# Patient Record
Sex: FEMALE | Race: WHITE | NOT HISPANIC OR LATINO
[De-identification: names, ages, dates, MRNs, and addresses within clinical notes are randomized per-mention and may not be internally consistent; named-entity substitution may affect disease eponyms.]

---

## 2020-12-13 ENCOUNTER — FORM ENCOUNTER (OUTPATIENT)
Age: 35
End: 2020-12-13

## 2020-12-14 ENCOUNTER — APPOINTMENT (OUTPATIENT)
Dept: OBGYN | Facility: CLINIC | Age: 35
End: 2020-12-14
Payer: COMMERCIAL

## 2020-12-14 PROCEDURE — 36415 COLL VENOUS BLD VENIPUNCTURE: CPT

## 2020-12-14 PROCEDURE — 99072 ADDL SUPL MATRL&STAF TM PHE: CPT

## 2020-12-14 PROCEDURE — 0500F INITIAL PRENATAL CARE VISIT: CPT

## 2020-12-14 PROCEDURE — 76813 OB US NUCHAL MEAS 1 GEST: CPT

## 2020-12-14 PROCEDURE — 76801 OB US < 14 WKS SINGLE FETUS: CPT | Mod: 59

## 2020-12-20 ENCOUNTER — FORM ENCOUNTER (OUTPATIENT)
Age: 35
End: 2020-12-20

## 2020-12-22 ENCOUNTER — FORM ENCOUNTER (OUTPATIENT)
Age: 35
End: 2020-12-22

## 2020-12-29 ENCOUNTER — FORM ENCOUNTER (OUTPATIENT)
Age: 35
End: 2020-12-29

## 2020-12-30 ENCOUNTER — NON-APPOINTMENT (OUTPATIENT)
Age: 35
End: 2020-12-30

## 2021-01-05 ENCOUNTER — APPOINTMENT (OUTPATIENT)
Dept: CARDIOLOGY | Facility: CLINIC | Age: 36
End: 2021-01-05
Payer: COMMERCIAL

## 2021-01-05 ENCOUNTER — OUTPATIENT (OUTPATIENT)
Dept: OUTPATIENT SERVICES | Facility: HOSPITAL | Age: 36
LOS: 1 days | End: 2021-01-05
Payer: COMMERCIAL

## 2021-01-05 ENCOUNTER — APPOINTMENT (OUTPATIENT)
Dept: CV DIAGNOSITCS | Facility: HOSPITAL | Age: 36
End: 2021-01-05

## 2021-01-05 ENCOUNTER — NON-APPOINTMENT (OUTPATIENT)
Age: 36
End: 2021-01-05

## 2021-01-05 VITALS
WEIGHT: 179 LBS | DIASTOLIC BLOOD PRESSURE: 76 MMHG | HEIGHT: 63 IN | SYSTOLIC BLOOD PRESSURE: 119 MMHG | HEART RATE: 77 BPM | OXYGEN SATURATION: 99 % | BODY MASS INDEX: 31.71 KG/M2

## 2021-01-05 DIAGNOSIS — Z34.90 ENCOUNTER FOR SUPERVISION OF NORMAL PREGNANCY, UNSPECIFIED, UNSPECIFIED TRIMESTER: ICD-10-CM

## 2021-01-05 PROCEDURE — 93306 TTE W/DOPPLER COMPLETE: CPT | Mod: 26

## 2021-01-05 PROCEDURE — 93000 ELECTROCARDIOGRAM COMPLETE: CPT

## 2021-01-05 PROCEDURE — 99244 OFF/OP CNSLTJ NEW/EST MOD 40: CPT

## 2021-01-05 PROCEDURE — 99072 ADDL SUPL MATRL&STAF TM PHE: CPT

## 2021-01-05 PROCEDURE — 93306 TTE W/DOPPLER COMPLETE: CPT

## 2021-01-05 RX ORDER — B-COMPLEX WITH VITAMIN C
TABLET ORAL
Refills: 0 | Status: ACTIVE | COMMUNITY

## 2021-01-05 NOTE — PHYSICAL EXAM
[General Appearance - Well Developed] : well developed [Normal Appearance] : normal appearance [Well Groomed] : well groomed [General Appearance - Well Nourished] : well nourished [No Deformities] : no deformities [General Appearance - In No Acute Distress] : no acute distress [Normal Conjunctiva] : the conjunctiva exhibited no abnormalities [Eyelids - No Xanthelasma] : the eyelids demonstrated no xanthelasmas [Normal Oral Mucosa] : normal oral mucosa [No Oral Pallor] : no oral pallor [No Oral Cyanosis] : no oral cyanosis [Normal Jugular Venous A Waves Present] : normal jugular venous A waves present [Normal Jugular Venous V Waves Present] : normal jugular venous V waves present [No Jugular Venous Nelson A Waves] : no jugular venous nelson A waves [Heart Rate And Rhythm] : heart rate and rhythm were normal [Heart Sounds] : normal S1 and S2 [Murmurs] : no murmurs present [Respiration, Rhythm And Depth] : normal respiratory rhythm and effort [Exaggerated Use Of Accessory Muscles For Inspiration] : no accessory muscle use [Auscultation Breath Sounds / Voice Sounds] : lungs were clear to auscultation bilaterally [Abdomen Soft] : soft [Abdomen Tenderness] : non-tender [Abdomen Mass (___ Cm)] : no abdominal mass palpated [Abnormal Walk] : normal gait [Gait - Sufficient For Exercise Testing] : the gait was sufficient for exercise testing [Nail Clubbing] : no clubbing of the fingernails [Cyanosis, Localized] : no localized cyanosis [Petechial Hemorrhages (___cm)] : no petechial hemorrhages [Skin Color & Pigmentation] : normal skin color and pigmentation [] : no rash [No Venous Stasis] : no venous stasis [Skin Lesions] : no skin lesions [No Skin Ulcers] : no skin ulcer [No Xanthoma] : no  xanthoma was observed [Oriented To Time, Place, And Person] : oriented to person, place, and time [Affect] : the affect was normal [Mood] : the mood was normal [No Anxiety] : not feeling anxious [FreeTextEntry1] : gravid

## 2021-01-05 NOTE — HISTORY OF PRESENT ILLNESS
[FreeTextEntry1] : Ele is a 35-year-old female 14 weeks gestation second pregnancy with lightheadedness. She has chronic pain and migraines on left side for which she sees a neurologist. Recently feeling lightheaded, clammy and flush which can occur at any time. She drinks a lot of fluid. Episodes happen mostly in the morning after breakfast. Did not happen last pregnancy (3.5 years ago)

## 2021-01-05 NOTE — DISCUSSION/SUMMARY
[FreeTextEntry1] : The patient is a 35-year-old female 14 weeks gestation, chronic migraines with symptomatic orthostasis. \par ECG normal, Drinks 100cc water daily, normal ECHO, add salt and compression stockings. Also treat GERD with TUMS which may help as well. If no improvement then low dose metoprolol

## 2021-01-06 ENCOUNTER — TRANSCRIPTION ENCOUNTER (OUTPATIENT)
Age: 36
End: 2021-01-06

## 2021-01-11 ENCOUNTER — APPOINTMENT (OUTPATIENT)
Dept: OBGYN | Facility: CLINIC | Age: 36
End: 2021-01-11
Payer: COMMERCIAL

## 2021-01-11 ENCOUNTER — APPOINTMENT (OUTPATIENT)
Dept: OBGYN | Facility: CLINIC | Age: 36
End: 2021-01-11

## 2021-01-11 PROCEDURE — 76817 TRANSVAGINAL US OBSTETRIC: CPT | Mod: 59

## 2021-01-11 PROCEDURE — 76816 OB US FOLLOW-UP PER FETUS: CPT

## 2021-01-11 PROCEDURE — 99072 ADDL SUPL MATRL&STAF TM PHE: CPT

## 2021-01-19 ENCOUNTER — FORM ENCOUNTER (OUTPATIENT)
Age: 36
End: 2021-01-19

## 2021-02-12 ENCOUNTER — ASOB RESULT (OUTPATIENT)
Age: 36
End: 2021-02-12

## 2021-02-12 ENCOUNTER — APPOINTMENT (OUTPATIENT)
Dept: ANTEPARTUM | Facility: CLINIC | Age: 36
End: 2021-02-12
Payer: COMMERCIAL

## 2021-02-12 PROCEDURE — 99072 ADDL SUPL MATRL&STAF TM PHE: CPT

## 2021-02-12 PROCEDURE — 76811 OB US DETAILED SNGL FETUS: CPT

## 2021-02-17 ENCOUNTER — APPOINTMENT (OUTPATIENT)
Dept: OBGYN | Facility: CLINIC | Age: 36
End: 2021-02-17

## 2021-03-10 ENCOUNTER — APPOINTMENT (OUTPATIENT)
Dept: OBGYN | Facility: CLINIC | Age: 36
End: 2021-03-10

## 2021-03-17 ENCOUNTER — OUTPATIENT (OUTPATIENT)
Dept: OUTPATIENT SERVICES | Facility: HOSPITAL | Age: 36
LOS: 1 days | End: 2021-03-17
Payer: COMMERCIAL

## 2021-03-17 ENCOUNTER — APPOINTMENT (OUTPATIENT)
Dept: OBGYN | Facility: CLINIC | Age: 36
End: 2021-03-17
Payer: COMMERCIAL

## 2021-03-17 VITALS — DIASTOLIC BLOOD PRESSURE: 61 MMHG | SYSTOLIC BLOOD PRESSURE: 121 MMHG | HEART RATE: 88 BPM

## 2021-03-17 VITALS
RESPIRATION RATE: 18 BRPM | HEART RATE: 81 BPM | SYSTOLIC BLOOD PRESSURE: 121 MMHG | OXYGEN SATURATION: 98 % | TEMPERATURE: 98 F | DIASTOLIC BLOOD PRESSURE: 61 MMHG

## 2021-03-17 VITALS — SYSTOLIC BLOOD PRESSURE: 128 MMHG | HEART RATE: 99 BPM | DIASTOLIC BLOOD PRESSURE: 67 MMHG

## 2021-03-17 DIAGNOSIS — Z3A.00 WEEKS OF GESTATION OF PREGNANCY NOT SPECIFIED: ICD-10-CM

## 2021-03-17 DIAGNOSIS — Z98.890 OTHER SPECIFIED POSTPROCEDURAL STATES: Chronic | ICD-10-CM

## 2021-03-17 DIAGNOSIS — O26.899 OTHER SPECIFIED PREGNANCY RELATED CONDITIONS, UNSPECIFIED TRIMESTER: ICD-10-CM

## 2021-03-17 DIAGNOSIS — Z98.891 HISTORY OF UTERINE SCAR FROM PREVIOUS SURGERY: Chronic | ICD-10-CM

## 2021-03-17 LAB
ALBUMIN SERPL ELPH-MCNC: 3.8 G/DL — SIGNIFICANT CHANGE UP (ref 3.3–5)
ALP SERPL-CCNC: 38 U/L — LOW (ref 40–120)
ALT FLD-CCNC: 12 U/L — SIGNIFICANT CHANGE UP (ref 10–45)
AMYLASE P1 CFR SERPL: 55 U/L — SIGNIFICANT CHANGE UP (ref 25–125)
ANION GAP SERPL CALC-SCNC: 15 MMOL/L — SIGNIFICANT CHANGE UP (ref 5–17)
APPEARANCE UR: CLEAR — SIGNIFICANT CHANGE UP
APTT BLD: 28.9 SEC — SIGNIFICANT CHANGE UP (ref 27.5–35.5)
AST SERPL-CCNC: 15 U/L — SIGNIFICANT CHANGE UP (ref 10–40)
BASOPHILS # BLD AUTO: 0.02 K/UL — SIGNIFICANT CHANGE UP (ref 0–0.2)
BASOPHILS NFR BLD AUTO: 0.2 % — SIGNIFICANT CHANGE UP (ref 0–2)
BILIRUB SERPL-MCNC: 0.1 MG/DL — LOW (ref 0.2–1.2)
BILIRUB UR-MCNC: NEGATIVE — SIGNIFICANT CHANGE UP
BUN SERPL-MCNC: 7 MG/DL — SIGNIFICANT CHANGE UP (ref 7–23)
CALCIUM SERPL-MCNC: 9.8 MG/DL — SIGNIFICANT CHANGE UP (ref 8.4–10.5)
CHLORIDE SERPL-SCNC: 103 MMOL/L — SIGNIFICANT CHANGE UP (ref 96–108)
CO2 SERPL-SCNC: 20 MMOL/L — LOW (ref 22–31)
COLOR SPEC: YELLOW — SIGNIFICANT CHANGE UP
CREAT ?TM UR-MCNC: 14 MG/DL — SIGNIFICANT CHANGE UP
CREAT SERPL-MCNC: 0.34 MG/DL — LOW (ref 0.5–1.3)
DIFF PNL FLD: NEGATIVE — SIGNIFICANT CHANGE UP
EOSINOPHIL # BLD AUTO: 0.05 K/UL — SIGNIFICANT CHANGE UP (ref 0–0.5)
EOSINOPHIL NFR BLD AUTO: 0.5 % — SIGNIFICANT CHANGE UP (ref 0–6)
FIBRINOGEN PPP-MCNC: 618 MG/DL — HIGH (ref 290–520)
GLUCOSE SERPL-MCNC: 82 MG/DL — SIGNIFICANT CHANGE UP (ref 70–99)
GLUCOSE UR QL: NEGATIVE — SIGNIFICANT CHANGE UP
HCT VFR BLD CALC: 35.6 % — SIGNIFICANT CHANGE UP (ref 34.5–45)
HGB BLD-MCNC: 11.6 G/DL — SIGNIFICANT CHANGE UP (ref 11.5–15.5)
IMM GRANULOCYTES NFR BLD AUTO: 1.4 % — SIGNIFICANT CHANGE UP (ref 0–1.5)
INR BLD: 1 RATIO — SIGNIFICANT CHANGE UP (ref 0.88–1.16)
KETONES UR-MCNC: NEGATIVE — SIGNIFICANT CHANGE UP
LDH SERPL L TO P-CCNC: 140 U/L — SIGNIFICANT CHANGE UP (ref 50–242)
LEUKOCYTE ESTERASE UR-ACNC: NEGATIVE — SIGNIFICANT CHANGE UP
LIDOCAIN IGE QN: 26 U/L — SIGNIFICANT CHANGE UP (ref 7–60)
LYMPHOCYTES # BLD AUTO: 1.73 K/UL — SIGNIFICANT CHANGE UP (ref 1–3.3)
LYMPHOCYTES # BLD AUTO: 16.7 % — SIGNIFICANT CHANGE UP (ref 13–44)
MCHC RBC-ENTMCNC: 29.7 PG — SIGNIFICANT CHANGE UP (ref 27–34)
MCHC RBC-ENTMCNC: 32.6 GM/DL — SIGNIFICANT CHANGE UP (ref 32–36)
MCV RBC AUTO: 91.3 FL — SIGNIFICANT CHANGE UP (ref 80–100)
MONOCYTES # BLD AUTO: 0.52 K/UL — SIGNIFICANT CHANGE UP (ref 0–0.9)
MONOCYTES NFR BLD AUTO: 5 % — SIGNIFICANT CHANGE UP (ref 2–14)
NEUTROPHILS # BLD AUTO: 7.9 K/UL — HIGH (ref 1.8–7.4)
NEUTROPHILS NFR BLD AUTO: 76.2 % — SIGNIFICANT CHANGE UP (ref 43–77)
NITRITE UR-MCNC: NEGATIVE — SIGNIFICANT CHANGE UP
NRBC # BLD: 0 /100 WBCS — SIGNIFICANT CHANGE UP (ref 0–0)
PH UR: 6.5 — SIGNIFICANT CHANGE UP (ref 5–8)
PLATELET # BLD AUTO: 229 K/UL — SIGNIFICANT CHANGE UP (ref 150–400)
POTASSIUM SERPL-MCNC: 4 MMOL/L — SIGNIFICANT CHANGE UP (ref 3.5–5.3)
POTASSIUM SERPL-SCNC: 4 MMOL/L — SIGNIFICANT CHANGE UP (ref 3.5–5.3)
PROT ?TM UR-MCNC: <4 MG/DL — SIGNIFICANT CHANGE UP (ref 0–12)
PROT SERPL-MCNC: 6.8 G/DL — SIGNIFICANT CHANGE UP (ref 6–8.3)
PROT UR-MCNC: NEGATIVE — SIGNIFICANT CHANGE UP
PROT/CREAT UR-RTO: SIGNIFICANT CHANGE UP (ref 0–0.2)
PROTHROM AB SERPL-ACNC: 12 SEC — SIGNIFICANT CHANGE UP (ref 10.6–13.6)
RBC # BLD: 3.9 M/UL — SIGNIFICANT CHANGE UP (ref 3.8–5.2)
RBC # FLD: 14.5 % — SIGNIFICANT CHANGE UP (ref 10.3–14.5)
SODIUM SERPL-SCNC: 138 MMOL/L — SIGNIFICANT CHANGE UP (ref 135–145)
SP GR SPEC: 1 — LOW (ref 1.01–1.02)
URATE SERPL-MCNC: 3.2 MG/DL — SIGNIFICANT CHANGE UP (ref 2.5–7)
UROBILINOGEN FLD QL: NEGATIVE — SIGNIFICANT CHANGE UP
WBC # BLD: 10.37 K/UL — SIGNIFICANT CHANGE UP (ref 3.8–10.5)
WBC # FLD AUTO: 10.37 K/UL — SIGNIFICANT CHANGE UP (ref 3.8–10.5)

## 2021-03-17 PROCEDURE — 82570 ASSAY OF URINE CREATININE: CPT

## 2021-03-17 PROCEDURE — 81003 URINALYSIS AUTO W/O SCOPE: CPT

## 2021-03-17 PROCEDURE — 85384 FIBRINOGEN ACTIVITY: CPT

## 2021-03-17 PROCEDURE — 85730 THROMBOPLASTIN TIME PARTIAL: CPT

## 2021-03-17 PROCEDURE — 59025 FETAL NON-STRESS TEST: CPT

## 2021-03-17 PROCEDURE — 85025 COMPLETE CBC W/AUTO DIFF WBC: CPT

## 2021-03-17 PROCEDURE — 83690 ASSAY OF LIPASE: CPT

## 2021-03-17 PROCEDURE — G0463: CPT

## 2021-03-17 PROCEDURE — 84156 ASSAY OF PROTEIN URINE: CPT

## 2021-03-17 PROCEDURE — 83615 LACTATE (LD) (LDH) ENZYME: CPT

## 2021-03-17 PROCEDURE — 85610 PROTHROMBIN TIME: CPT

## 2021-03-17 PROCEDURE — 99072 ADDL SUPL MATRL&STAF TM PHE: CPT

## 2021-03-17 PROCEDURE — 87086 URINE CULTURE/COLONY COUNT: CPT

## 2021-03-17 PROCEDURE — 80053 COMPREHEN METABOLIC PANEL: CPT

## 2021-03-17 PROCEDURE — 99214 OFFICE O/P EST MOD 30 MIN: CPT | Mod: 25

## 2021-03-17 PROCEDURE — 82150 ASSAY OF AMYLASE: CPT

## 2021-03-17 PROCEDURE — 76816 OB US FOLLOW-UP PER FETUS: CPT

## 2021-03-17 PROCEDURE — 84550 ASSAY OF BLOOD/URIC ACID: CPT

## 2021-03-17 NOTE — CHART NOTE - NSCHARTNOTEFT_GEN_A_CORE
Labs:              11.6   10.37 )-----------( 229      ( 03-17 @ 15:52 )             35.6       17 Mar 2021 15:52    138    |  103    |  7      ----------------------------<  82     4.0     |  20<L>  |  0.34<L>    Ca    9.8        17 Mar 2021 15:52    TPro  6.8    /  Alb  3.8    /  TBili  0.1<L>  /  DBili  x      /  AST  15     /  ALT  12     /  AlkPhos  38<L>  17 Mar 2021 15:52    PT/INR - ( 17 Mar 2021 15:52 )   PT: 12.0 sec;   INR: 1.00 ratio         PTT - ( 17 Mar 2021 15:52 )  PTT:28.9 sec  Uric Acid, Serum: 3.2 mg/dL (03-17-21 @ 15:52)    Lactate Dehydrogenase, Serum: 140 U/L (03-17-21 @ 15:52)        Lipase, Serum: 26 U/L (03.17.21 @ 15:52)   Amylase, Serum Total: 55 U/L (03.17.21 @ 15:52)     Urinalysis (03.17.21 @ 15:52)   Glucose Qualitative, Urine: Negative   Blood, Urine: Negative   pH Urine: 6.5   Color: Yellow   Urine Appearance: Clear   Bilirubin: Negative   Ketone - Urine: Negative   Specific Gravity: 1.005   Protein, Urine: Negative   Urobilinogen: Negative   Nitrite: Negative   Leukocyte Esterase Concentration: Negative     Protein/Creatinine Ratio, Urine (03.17.21 @ 15:52)   Creatinine, Random Urine: 14: Low Creatinine: Sample Integrity Questionable   Reference Ranges have NOT been established for random urine analytes due   to variability in fluid intake and concentration. mg/dL   Total Protein, Random Urine: <4: Protein too low to measure using this test. If proteinuria is suspected,   use the more sensitive URINE MICROALBUMIN test mg/dL   Protein/Creatinine Ratio Calculation: Unable to calculate
Pt reevaluated at bedside     EFM: pt on monitor since 1354. Overall baseline 140 mod maribell + accel, possibly one variable decel @ 1430 but discontinuous  [place]: no ctx    overall reassuring  pt has f/u appt next week in office  safe for d/c    TLal PGY4

## 2021-03-17 NOTE — OB PROVIDER TRIAGE NOTE - NSOBPROVIDERNOTE_OBGYN_ALL_OB_FT
A/P 34yo  @ 24w3d with abdominal pain and decreased FM, reassuring fetal status  -efm/toco  -cbc,cmp, amylase, lipase  -ua, urine cx  -sono done  DW Dr Jr Mir PAC A/P 36yo  @ 24w3d with abdominal pain and decreased FM, reassuring fetal status  -efm/toco  -cbc,cmp, amylase, lipase  -ua, urine cx  -sono done  DW Dr Jr Mir PAC    Addendum  Labs reviewed and wnl  Pt okay for discharge after continuous FHT  Pt to follow up early next week  VIVIANA Mir PAC

## 2021-03-17 NOTE — OB RN TRIAGE NOTE - PSH
H/O ovarian cystectomy  10yrs ago  H/O:   2017 GDM failwed induction  History of orthopedic surgery  Knee and foot surgery

## 2021-03-17 NOTE — OB PROVIDER TRIAGE NOTE - NSHPPHYSICALEXAM_GEN_ALL_CORE
T(C): 36.9 (03-17-21 @ 13:53), Max: 36.9 (03-17-21 @ 13:53)  HR: 81 (03-17-21 @ 15:23) (76 - 89)  BP: 121/61 (03-17-21 @ 13:53) (121/61 - 121/61)  RR: 18 (03-17-21 @ 13:53) (18 - 18)  SpO2: 98% (03-17-21 @ 15:23) (97% - 99%)  GEN:NAD  CV:RRR  Pulm: CTA bl  Abd soft NT gravid  FHT:   140  , mod maribell, +10x10 accels, variable decels   TOCO:  none  VE: deferred  SONO transverse, active fetus, MVP 5.28

## 2021-03-17 NOTE — OB PROVIDER TRIAGE NOTE - HISTORY OF PRESENT ILLNESS
PA Triage Note  36yo  EDC 21 @ 24w3d c/o constant upper abdominal pain x3 days. Pt states that pain feels like a "bruise" made worse by clothing, fetal movement, bending. Tylenol does not improve pain. Not associated with nausea/vomiting/diarrhea/fever/chills/dysuria. Normal bowel movement today. Pt admits to difficulty initiating void. Denies ctx/lof/vb. +FM but decreased from usual. Uncomplicated PNC    OB:2017 pltcs 7#14, GDM, failed IOL, residual left flank pain since cs  GYN: ho ov cyst, sp lap ovarian cystectomy; h/o abnl pap/HPV, sp LEEP x2 >10yrs ago  PMH: Chronic left flank , residual from cs, has had full work up; Migraines; Mild vertigo  PSH:2017 cs, 2011 ov cystectomy; Left foot, left knee  PSH:PNV, Probiotic, DHA, Miralax, Mg, Zyrtec  ALL:estrogen, narcotics, lidocaine  Accepts blood  Soc: denies anxiety/depression

## 2021-03-18 PROBLEM — T78.3XXA ANGIONEUROTIC EDEMA, INITIAL ENCOUNTER: Chronic | Status: ACTIVE | Noted: 2021-03-17

## 2021-03-18 PROBLEM — G89.29 OTHER CHRONIC PAIN: Chronic | Status: ACTIVE | Noted: 2021-03-17

## 2021-03-18 LAB
CULTURE RESULTS: SIGNIFICANT CHANGE UP
PROT ?TM UR-MCNC: <4 MG/DL — SIGNIFICANT CHANGE UP (ref 0–12)
SPECIMEN SOURCE: SIGNIFICANT CHANGE UP

## 2021-03-22 ENCOUNTER — FORM ENCOUNTER (OUTPATIENT)
Age: 36
End: 2021-03-22

## 2021-03-23 ENCOUNTER — RESULT REVIEW (OUTPATIENT)
Age: 36
End: 2021-03-23

## 2021-03-23 ENCOUNTER — APPOINTMENT (OUTPATIENT)
Dept: OBGYN | Facility: CLINIC | Age: 36
End: 2021-03-23

## 2021-03-31 ENCOUNTER — FORM ENCOUNTER (OUTPATIENT)
Age: 36
End: 2021-03-31

## 2021-04-01 ENCOUNTER — APPOINTMENT (OUTPATIENT)
Dept: OBGYN | Facility: CLINIC | Age: 36
End: 2021-04-01
Payer: COMMERCIAL

## 2021-04-01 PROCEDURE — 0502F SUBSEQUENT PRENATAL CARE: CPT

## 2021-04-01 PROCEDURE — 36415 COLL VENOUS BLD VENIPUNCTURE: CPT

## 2021-04-14 ENCOUNTER — ASOB RESULT (OUTPATIENT)
Age: 36
End: 2021-04-14

## 2021-04-14 ENCOUNTER — APPOINTMENT (OUTPATIENT)
Dept: MATERNAL FETAL MEDICINE | Facility: CLINIC | Age: 36
End: 2021-04-14
Payer: COMMERCIAL

## 2021-04-14 PROCEDURE — G0109 DIAB MANAGE TRN IND/GROUP: CPT | Mod: 95

## 2021-04-21 ENCOUNTER — NON-APPOINTMENT (OUTPATIENT)
Age: 36
End: 2021-04-21

## 2021-04-26 ENCOUNTER — NON-APPOINTMENT (OUTPATIENT)
Age: 36
End: 2021-04-26

## 2021-04-27 ENCOUNTER — ASOB RESULT (OUTPATIENT)
Age: 36
End: 2021-04-27

## 2021-04-27 ENCOUNTER — APPOINTMENT (OUTPATIENT)
Dept: MATERNAL FETAL MEDICINE | Facility: CLINIC | Age: 36
End: 2021-04-27
Payer: COMMERCIAL

## 2021-04-27 PROCEDURE — G0108 DIAB MANAGE TRN  PER INDIV: CPT | Mod: 95

## 2021-04-28 ENCOUNTER — APPOINTMENT (OUTPATIENT)
Dept: OBGYN | Facility: CLINIC | Age: 36
End: 2021-04-28
Payer: COMMERCIAL

## 2021-04-28 PROCEDURE — 0502F SUBSEQUENT PRENATAL CARE: CPT

## 2021-04-30 ENCOUNTER — OUTPATIENT (OUTPATIENT)
Dept: OUTPATIENT SERVICES | Facility: HOSPITAL | Age: 36
LOS: 1 days | End: 2021-04-30
Payer: COMMERCIAL

## 2021-04-30 VITALS
TEMPERATURE: 98 F | RESPIRATION RATE: 18 BRPM | DIASTOLIC BLOOD PRESSURE: 70 MMHG | HEART RATE: 87 BPM | SYSTOLIC BLOOD PRESSURE: 117 MMHG

## 2021-04-30 DIAGNOSIS — O26.899 OTHER SPECIFIED PREGNANCY RELATED CONDITIONS, UNSPECIFIED TRIMESTER: ICD-10-CM

## 2021-04-30 DIAGNOSIS — Z3A.00 WEEKS OF GESTATION OF PREGNANCY NOT SPECIFIED: ICD-10-CM

## 2021-04-30 DIAGNOSIS — Z98.891 HISTORY OF UTERINE SCAR FROM PREVIOUS SURGERY: Chronic | ICD-10-CM

## 2021-04-30 DIAGNOSIS — Z98.890 OTHER SPECIFIED POSTPROCEDURAL STATES: Chronic | ICD-10-CM

## 2021-04-30 LAB
ALBUMIN SERPL ELPH-MCNC: 3.6 G/DL — SIGNIFICANT CHANGE UP (ref 3.3–5)
ALP SERPL-CCNC: 42 U/L — SIGNIFICANT CHANGE UP (ref 40–120)
ALT FLD-CCNC: 11 U/L — SIGNIFICANT CHANGE UP (ref 10–45)
ANION GAP SERPL CALC-SCNC: 16 MMOL/L — SIGNIFICANT CHANGE UP (ref 5–17)
APPEARANCE UR: CLEAR — SIGNIFICANT CHANGE UP
APTT BLD: 27.5 SEC — SIGNIFICANT CHANGE UP (ref 27.5–35.5)
AST SERPL-CCNC: 23 U/L — SIGNIFICANT CHANGE UP (ref 10–40)
BASOPHILS # BLD AUTO: 0.02 K/UL — SIGNIFICANT CHANGE UP (ref 0–0.2)
BASOPHILS NFR BLD AUTO: 0.2 % — SIGNIFICANT CHANGE UP (ref 0–2)
BILIRUB SERPL-MCNC: <0.1 MG/DL — LOW (ref 0.2–1.2)
BILIRUB UR-MCNC: NEGATIVE — SIGNIFICANT CHANGE UP
BUN SERPL-MCNC: 11 MG/DL — SIGNIFICANT CHANGE UP (ref 7–23)
CALCIUM SERPL-MCNC: 8.9 MG/DL — SIGNIFICANT CHANGE UP (ref 8.4–10.5)
CHLORIDE SERPL-SCNC: 105 MMOL/L — SIGNIFICANT CHANGE UP (ref 96–108)
CO2 SERPL-SCNC: 16 MMOL/L — LOW (ref 22–31)
COLOR SPEC: SIGNIFICANT CHANGE UP
CREAT ?TM UR-MCNC: 7 MG/DL — SIGNIFICANT CHANGE UP
CREAT SERPL-MCNC: 0.3 MG/DL — LOW (ref 0.5–1.3)
DIFF PNL FLD: NEGATIVE — SIGNIFICANT CHANGE UP
EOSINOPHIL # BLD AUTO: 0.16 K/UL — SIGNIFICANT CHANGE UP (ref 0–0.5)
EOSINOPHIL NFR BLD AUTO: 1.6 % — SIGNIFICANT CHANGE UP (ref 0–6)
FIBRINOGEN PPP-MCNC: 633 MG/DL — HIGH (ref 290–520)
GLUCOSE SERPL-MCNC: 85 MG/DL — SIGNIFICANT CHANGE UP (ref 70–99)
GLUCOSE UR QL: NEGATIVE — SIGNIFICANT CHANGE UP
HCT VFR BLD CALC: 35.1 % — SIGNIFICANT CHANGE UP (ref 34.5–45)
HGB BLD-MCNC: 11.5 G/DL — SIGNIFICANT CHANGE UP (ref 11.5–15.5)
IMM GRANULOCYTES NFR BLD AUTO: 1.7 % — HIGH (ref 0–1.5)
INR BLD: 0.97 RATIO — SIGNIFICANT CHANGE UP (ref 0.88–1.16)
KETONES UR-MCNC: NEGATIVE — SIGNIFICANT CHANGE UP
LDH SERPL L TO P-CCNC: 229 U/L — SIGNIFICANT CHANGE UP (ref 50–242)
LEUKOCYTE ESTERASE UR-ACNC: NEGATIVE — SIGNIFICANT CHANGE UP
LYMPHOCYTES # BLD AUTO: 1.29 K/UL — SIGNIFICANT CHANGE UP (ref 1–3.3)
LYMPHOCYTES # BLD AUTO: 13 % — SIGNIFICANT CHANGE UP (ref 13–44)
MCHC RBC-ENTMCNC: 30.1 PG — SIGNIFICANT CHANGE UP (ref 27–34)
MCHC RBC-ENTMCNC: 32.8 GM/DL — SIGNIFICANT CHANGE UP (ref 32–36)
MCV RBC AUTO: 91.9 FL — SIGNIFICANT CHANGE UP (ref 80–100)
MONOCYTES # BLD AUTO: 0.58 K/UL — SIGNIFICANT CHANGE UP (ref 0–0.9)
MONOCYTES NFR BLD AUTO: 5.8 % — SIGNIFICANT CHANGE UP (ref 2–14)
NEUTROPHILS # BLD AUTO: 7.71 K/UL — HIGH (ref 1.8–7.4)
NEUTROPHILS NFR BLD AUTO: 77.7 % — HIGH (ref 43–77)
NITRITE UR-MCNC: NEGATIVE — SIGNIFICANT CHANGE UP
NRBC # BLD: 0 /100 WBCS — SIGNIFICANT CHANGE UP (ref 0–0)
PH UR: 6.5 — SIGNIFICANT CHANGE UP (ref 5–8)
PLATELET # BLD AUTO: 208 K/UL — SIGNIFICANT CHANGE UP (ref 150–400)
POTASSIUM SERPL-MCNC: 4.3 MMOL/L — SIGNIFICANT CHANGE UP (ref 3.5–5.3)
POTASSIUM SERPL-SCNC: 4.3 MMOL/L — SIGNIFICANT CHANGE UP (ref 3.5–5.3)
PROT ?TM UR-MCNC: <4 MG/DL — SIGNIFICANT CHANGE UP (ref 0–12)
PROT SERPL-MCNC: 6.5 G/DL — SIGNIFICANT CHANGE UP (ref 6–8.3)
PROT UR-MCNC: NEGATIVE — SIGNIFICANT CHANGE UP
PROT/CREAT UR-RTO: SIGNIFICANT CHANGE UP (ref 0–0.2)
PROTHROM AB SERPL-ACNC: 11.7 SEC — SIGNIFICANT CHANGE UP (ref 10.6–13.6)
RBC # BLD: 3.82 M/UL — SIGNIFICANT CHANGE UP (ref 3.8–5.2)
RBC # FLD: 14.7 % — HIGH (ref 10.3–14.5)
SODIUM SERPL-SCNC: 137 MMOL/L — SIGNIFICANT CHANGE UP (ref 135–145)
SP GR SPEC: 1 — LOW (ref 1.01–1.02)
URATE SERPL-MCNC: 3.3 MG/DL — SIGNIFICANT CHANGE UP (ref 2.5–7)
UROBILINOGEN FLD QL: NEGATIVE — SIGNIFICANT CHANGE UP
WBC # BLD: 9.93 K/UL — SIGNIFICANT CHANGE UP (ref 3.8–10.5)
WBC # FLD AUTO: 9.93 K/UL — SIGNIFICANT CHANGE UP (ref 3.8–10.5)

## 2021-04-30 PROCEDURE — 85610 PROTHROMBIN TIME: CPT

## 2021-04-30 PROCEDURE — 85025 COMPLETE CBC W/AUTO DIFF WBC: CPT

## 2021-04-30 PROCEDURE — 83690 ASSAY OF LIPASE: CPT

## 2021-04-30 PROCEDURE — 59025 FETAL NON-STRESS TEST: CPT

## 2021-04-30 PROCEDURE — 81003 URINALYSIS AUTO W/O SCOPE: CPT

## 2021-04-30 PROCEDURE — 84156 ASSAY OF PROTEIN URINE: CPT

## 2021-04-30 PROCEDURE — 83615 LACTATE (LD) (LDH) ENZYME: CPT

## 2021-04-30 PROCEDURE — 85730 THROMBOPLASTIN TIME PARTIAL: CPT

## 2021-04-30 PROCEDURE — 84550 ASSAY OF BLOOD/URIC ACID: CPT

## 2021-04-30 PROCEDURE — 85384 FIBRINOGEN ACTIVITY: CPT

## 2021-04-30 PROCEDURE — 82570 ASSAY OF URINE CREATININE: CPT

## 2021-04-30 PROCEDURE — 80053 COMPREHEN METABOLIC PANEL: CPT

## 2021-04-30 PROCEDURE — G0463: CPT

## 2021-04-30 PROCEDURE — 82962 GLUCOSE BLOOD TEST: CPT

## 2021-04-30 PROCEDURE — 82150 ASSAY OF AMYLASE: CPT

## 2021-04-30 RX ORDER — SODIUM CHLORIDE 9 MG/ML
1000 INJECTION, SOLUTION INTRAVENOUS ONCE
Refills: 0 | Status: DISCONTINUED | OUTPATIENT
Start: 2021-04-30 | End: 2021-05-14

## 2021-04-30 NOTE — OB PROVIDER TRIAGE NOTE - HISTORY OF PRESENT ILLNESS
34 yo  at [id number] presents with nausea, dizziness, sweats, higher FS after initiation of NPH 14u two days ago for GDMA2. Patient reports previously diet controlled gestational diabetes with good postprandial control, but elevated fasting -110s.  34 yo  at [id number] presents with nausea, dizziness, sweats, higher FS after initiation of Novolin 14u two days ago for GDMA2. Patient reports previously diet controlled gestational diabetes with good postprandial control, but elevated fasting -110s. She was started on Novolin 14u qHS on Wednesday and reports not feeling well since. She reports developing an itchy rash on her inner thigh along her bikini line after starting Novolin. She reports starting to feel unwell after Novolin administration before bed last night and random FS at 5AM was 116. She reports higher than usual FS despite insulin. Her fasting FS was 120 this morning and postprandial breakfast . She reports nausea, but able to tolerate oatmeal with yogurt, banana and almond butter for breakfast. Nausea currently improved. She also reports some lower abdominal pressure and cramping earlier, which has now resolved. She denies VB, LOF and endorses good FM.    FS in triage: 88   Patient last ate at 10AM     OBHx - pLTCS (2017), failed IOL for GDMA2, residual left flank pain since  GYNHx - s/p lsc L ovarian cystectomy, LEEP x2   PMHx - vertigo, migraines  PSHx - C/S x1, lsc ovarian cystectomy, LEEP x2, wisdom teeth removal, L foot and L knee sx  Meds - PNV, DHA, miralax, B2, Mg   Allergies - narcotics with anaphylaxis to higher dose of narcotics per patient   Social - denies tobacco, alcohol, recreational drugs  Psych - denies anxiety, depression

## 2021-04-30 NOTE — OB PROVIDER TRIAGE NOTE - NSHPLABSRESULTS_GEN_ALL_CORE
Vital Signs Last 24 Hrs  T(C): 36.6 (2021 11:25), Max: 36.6 (2021 11:25)  T(F): 97.9 (2021 11:25), Max: 97.9 (2021 11:25)  HR: 87 (2021 11:25) (87 - 87)  BP: 117/70 (2021 11:25) (117/70 - 117/70)  BP(mean): --  RR: 18 (2021 11:25) (18 - 18)  SpO2: --    I&O's Detail                            11.5   9.93  )-----------( 208      ( 2021 12:13 )             35.1       04    137  |  105  |  11  ----------------------------<  85  4.3   |  16<L>  |  0.30<L>    Ca    8.9      2021 12:13    TPro  6.5  /  Alb  3.6  /  TBili  <0.1<L>  /  DBili  x   /  AST  23  /  ALT  11  /  AlkPhos  42  04-30      CAPILLARY BLOOD GLUCOSE      POCT Blood Glucose.: 88 mg/dL (2021 11:23)      LIVER FUNCTIONS - ( 2021 12:13 )  Alb: 3.6 g/dL / Pro: 6.5 g/dL / ALK PHOS: 42 U/L / ALT: 11 U/L / AST: 23 U/L / GGT: x             PT/INR - ( 2021 12:13 )   PT: 11.7 sec;   INR: 0.97 ratio         PTT - ( 2021 12:13 )  PTT:27.5 sec    Urinalysis Basic - ( 2021 12:13 )    Color: Light Yellow / Appearance: Clear / S.004 / pH: x  Gluc: x / Ketone: Negative  / Bili: Negative / Urobili: Negative   Blood: x / Protein: Negative / Nitrite: Negative   Leuk Esterase: Negative / RBC: x / WBC x   Sq Epi: x / Non Sq Epi: x / Bacteria: x

## 2021-04-30 NOTE — OB PROVIDER TRIAGE NOTE - NSHPPHYSICALEXAM_GEN_ALL_CORE
Physical Exam:   General: sitting comfortably in bed, NAD, AOx3   CV: RRR    Lungs: CTA b/l, good air flow b/l   Back: No CVA tenderness  Abd: soft, gravid, nontender, no rebound or guarding   Pelvic: mild erythema along inner thigh along bikini line consistent with fungal rash   Ext: NT b/l, no edema

## 2021-04-30 NOTE — OB PROVIDER TRIAGE NOTE - NSOBPROVIDERNOTE_OBGYN_ALL_OB_FT
34 yo  at [id number] presents with nausea, lightheadedness, dizziness, sweats and high FS than her usual after initiation of Novolin 14u QHS two days ago. Random FS 88. CBC, CMP wnl. UA neg.  - IV fluid bolus for lightheadedness  - Patient evaluated w/Dr. Argueta and finger sticks reviewed - given patient's reported sensitivity to medications and overall well-controlled finger sticks on diet-control, patient's Novolin decreased to 8units   - Diabetes educator Trae to follow up with patient within the week to review finger sticks/diet  - Patient recommended applying Monistat 7 on likely candidal rash along bikini line     E Demirel PGY3  Patient seen and evaluated w/Dr. Argueta   d/w Dr. Mckay

## 2021-05-04 ENCOUNTER — NON-APPOINTMENT (OUTPATIENT)
Age: 36
End: 2021-05-04

## 2021-05-04 ENCOUNTER — APPOINTMENT (OUTPATIENT)
Dept: OBGYN | Facility: CLINIC | Age: 36
End: 2021-05-04
Payer: COMMERCIAL

## 2021-05-04 PROCEDURE — 0502F SUBSEQUENT PRENATAL CARE: CPT

## 2021-05-06 ENCOUNTER — NON-APPOINTMENT (OUTPATIENT)
Age: 36
End: 2021-05-06

## 2021-05-11 ENCOUNTER — ASOB RESULT (OUTPATIENT)
Age: 36
End: 2021-05-11

## 2021-05-11 ENCOUNTER — APPOINTMENT (OUTPATIENT)
Dept: OBGYN | Facility: CLINIC | Age: 36
End: 2021-05-11
Payer: COMMERCIAL

## 2021-05-11 ENCOUNTER — APPOINTMENT (OUTPATIENT)
Dept: MATERNAL FETAL MEDICINE | Facility: CLINIC | Age: 36
End: 2021-05-11
Payer: COMMERCIAL

## 2021-05-11 PROCEDURE — 0502F SUBSEQUENT PRENATAL CARE: CPT

## 2021-05-11 PROCEDURE — 99072 ADDL SUPL MATRL&STAF TM PHE: CPT

## 2021-05-11 PROCEDURE — 76818 FETAL BIOPHYS PROFILE W/NST: CPT

## 2021-05-11 PROCEDURE — 76816 OB US FOLLOW-UP PER FETUS: CPT

## 2021-05-11 PROCEDURE — G0108 DIAB MANAGE TRN  PER INDIV: CPT | Mod: 95

## 2021-05-12 ENCOUNTER — FORM ENCOUNTER (OUTPATIENT)
Age: 36
End: 2021-05-12

## 2021-05-17 ENCOUNTER — APPOINTMENT (OUTPATIENT)
Dept: OBGYN | Facility: CLINIC | Age: 36
End: 2021-05-17
Payer: COMMERCIAL

## 2021-05-17 PROCEDURE — 0502F SUBSEQUENT PRENATAL CARE: CPT

## 2021-05-17 PROCEDURE — 99072 ADDL SUPL MATRL&STAF TM PHE: CPT

## 2021-05-17 PROCEDURE — 76819 FETAL BIOPHYS PROFIL W/O NST: CPT

## 2021-05-24 ENCOUNTER — APPOINTMENT (OUTPATIENT)
Dept: OBGYN | Facility: CLINIC | Age: 36
End: 2021-05-24

## 2021-05-27 ENCOUNTER — ASOB RESULT (OUTPATIENT)
Age: 36
End: 2021-05-27

## 2021-05-27 ENCOUNTER — APPOINTMENT (OUTPATIENT)
Dept: MATERNAL FETAL MEDICINE | Facility: CLINIC | Age: 36
End: 2021-05-27
Payer: COMMERCIAL

## 2021-05-27 PROCEDURE — G0108 DIAB MANAGE TRN  PER INDIV: CPT | Mod: 95

## 2021-06-01 ENCOUNTER — NON-APPOINTMENT (OUTPATIENT)
Age: 36
End: 2021-06-01

## 2021-06-03 ENCOUNTER — NON-APPOINTMENT (OUTPATIENT)
Age: 36
End: 2021-06-03

## 2021-06-03 ENCOUNTER — APPOINTMENT (OUTPATIENT)
Dept: OBGYN | Facility: CLINIC | Age: 36
End: 2021-06-03
Payer: COMMERCIAL

## 2021-06-03 PROCEDURE — 0502F SUBSEQUENT PRENATAL CARE: CPT

## 2021-06-07 ENCOUNTER — APPOINTMENT (OUTPATIENT)
Dept: MATERNAL FETAL MEDICINE | Facility: CLINIC | Age: 36
End: 2021-06-07
Payer: COMMERCIAL

## 2021-06-07 ENCOUNTER — ASOB RESULT (OUTPATIENT)
Age: 36
End: 2021-06-07

## 2021-06-07 ENCOUNTER — FORM ENCOUNTER (OUTPATIENT)
Age: 36
End: 2021-06-07

## 2021-06-07 PROCEDURE — G0108 DIAB MANAGE TRN  PER INDIV: CPT | Mod: 95

## 2021-06-08 ENCOUNTER — APPOINTMENT (OUTPATIENT)
Dept: OBGYN | Facility: CLINIC | Age: 36
End: 2021-06-08
Payer: COMMERCIAL

## 2021-06-08 ENCOUNTER — FORM ENCOUNTER (OUTPATIENT)
Age: 36
End: 2021-06-08

## 2021-06-08 PROCEDURE — 76818 FETAL BIOPHYS PROFILE W/NST: CPT

## 2021-06-08 PROCEDURE — 76816 OB US FOLLOW-UP PER FETUS: CPT

## 2021-06-08 PROCEDURE — 99072 ADDL SUPL MATRL&STAF TM PHE: CPT

## 2021-06-10 ENCOUNTER — APPOINTMENT (OUTPATIENT)
Dept: MATERNAL FETAL MEDICINE | Facility: CLINIC | Age: 36
End: 2021-06-10

## 2021-06-15 ENCOUNTER — APPOINTMENT (OUTPATIENT)
Dept: OBGYN | Facility: CLINIC | Age: 36
End: 2021-06-15
Payer: COMMERCIAL

## 2021-06-15 PROCEDURE — 0502F SUBSEQUENT PRENATAL CARE: CPT

## 2021-06-15 PROCEDURE — 99072 ADDL SUPL MATRL&STAF TM PHE: CPT

## 2021-06-15 PROCEDURE — 76818 FETAL BIOPHYS PROFILE W/NST: CPT

## 2021-06-21 ENCOUNTER — APPOINTMENT (OUTPATIENT)
Dept: MATERNAL FETAL MEDICINE | Facility: CLINIC | Age: 36
End: 2021-06-21
Payer: COMMERCIAL

## 2021-06-21 ENCOUNTER — ASOB RESULT (OUTPATIENT)
Age: 36
End: 2021-06-21

## 2021-06-21 PROCEDURE — G0108 DIAB MANAGE TRN  PER INDIV: CPT | Mod: 95

## 2021-06-22 ENCOUNTER — OUTPATIENT (OUTPATIENT)
Dept: OUTPATIENT SERVICES | Facility: HOSPITAL | Age: 36
LOS: 1 days | End: 2021-06-22
Payer: COMMERCIAL

## 2021-06-22 VITALS
RESPIRATION RATE: 16 BRPM | OXYGEN SATURATION: 99 % | DIASTOLIC BLOOD PRESSURE: 76 MMHG | WEIGHT: 225.53 LBS | HEART RATE: 78 BPM | SYSTOLIC BLOOD PRESSURE: 113 MMHG | HEIGHT: 63 IN | TEMPERATURE: 98 F

## 2021-06-22 DIAGNOSIS — Z98.891 HISTORY OF UTERINE SCAR FROM PREVIOUS SURGERY: Chronic | ICD-10-CM

## 2021-06-22 DIAGNOSIS — O34.211 MATERNAL CARE FOR LOW TRANSVERSE SCAR FROM PREVIOUS CESAREAN DELIVERY: ICD-10-CM

## 2021-06-22 DIAGNOSIS — Z01.818 ENCOUNTER FOR OTHER PREPROCEDURAL EXAMINATION: ICD-10-CM

## 2021-06-22 DIAGNOSIS — Z29.9 ENCOUNTER FOR PROPHYLACTIC MEASURES, UNSPECIFIED: ICD-10-CM

## 2021-06-22 DIAGNOSIS — Z98.890 OTHER SPECIFIED POSTPROCEDURAL STATES: Chronic | ICD-10-CM

## 2021-06-22 LAB
A1C WITH ESTIMATED AVERAGE GLUCOSE RESULT: 5.5 % — SIGNIFICANT CHANGE UP (ref 4–5.6)
ANION GAP SERPL CALC-SCNC: 14 MMOL/L — SIGNIFICANT CHANGE UP (ref 5–17)
BLD GP AB SCN SERPL QL: NEGATIVE — SIGNIFICANT CHANGE UP
BUN SERPL-MCNC: 15 MG/DL — SIGNIFICANT CHANGE UP (ref 7–23)
CALCIUM SERPL-MCNC: 9.9 MG/DL — SIGNIFICANT CHANGE UP (ref 8.4–10.5)
CHLORIDE SERPL-SCNC: 103 MMOL/L — SIGNIFICANT CHANGE UP (ref 96–108)
CO2 SERPL-SCNC: 18 MMOL/L — LOW (ref 22–31)
CREAT SERPL-MCNC: 0.41 MG/DL — LOW (ref 0.5–1.3)
ESTIMATED AVERAGE GLUCOSE: 111 MG/DL — SIGNIFICANT CHANGE UP (ref 68–114)
GLUCOSE SERPL-MCNC: 99 MG/DL — SIGNIFICANT CHANGE UP (ref 70–99)
HCT VFR BLD CALC: 37.3 % — SIGNIFICANT CHANGE UP (ref 34.5–45)
HGB BLD-MCNC: 12.2 G/DL — SIGNIFICANT CHANGE UP (ref 11.5–15.5)
MCHC RBC-ENTMCNC: 29.9 PG — SIGNIFICANT CHANGE UP (ref 27–34)
MCHC RBC-ENTMCNC: 32.7 GM/DL — SIGNIFICANT CHANGE UP (ref 32–36)
MCV RBC AUTO: 91.4 FL — SIGNIFICANT CHANGE UP (ref 80–100)
NRBC # BLD: 0 /100 WBCS — SIGNIFICANT CHANGE UP (ref 0–0)
PLATELET # BLD AUTO: 174 K/UL — SIGNIFICANT CHANGE UP (ref 150–400)
POTASSIUM SERPL-MCNC: 4.3 MMOL/L — SIGNIFICANT CHANGE UP (ref 3.5–5.3)
POTASSIUM SERPL-SCNC: 4.3 MMOL/L — SIGNIFICANT CHANGE UP (ref 3.5–5.3)
RBC # BLD: 4.08 M/UL — SIGNIFICANT CHANGE UP (ref 3.8–5.2)
RBC # FLD: 14.7 % — HIGH (ref 10.3–14.5)
RH IG SCN BLD-IMP: POSITIVE — SIGNIFICANT CHANGE UP
SODIUM SERPL-SCNC: 135 MMOL/L — SIGNIFICANT CHANGE UP (ref 135–145)
WBC # BLD: 9.59 K/UL — SIGNIFICANT CHANGE UP (ref 3.8–10.5)
WBC # FLD AUTO: 9.59 K/UL — SIGNIFICANT CHANGE UP (ref 3.8–10.5)

## 2021-06-22 PROCEDURE — 86901 BLOOD TYPING SEROLOGIC RH(D): CPT

## 2021-06-22 PROCEDURE — 85027 COMPLETE CBC AUTOMATED: CPT

## 2021-06-22 PROCEDURE — 86900 BLOOD TYPING SEROLOGIC ABO: CPT

## 2021-06-22 PROCEDURE — 83036 HEMOGLOBIN GLYCOSYLATED A1C: CPT

## 2021-06-22 PROCEDURE — 86850 RBC ANTIBODY SCREEN: CPT

## 2021-06-22 PROCEDURE — G0463: CPT

## 2021-06-22 PROCEDURE — 80048 BASIC METABOLIC PNL TOTAL CA: CPT

## 2021-06-22 RX ORDER — CETIRIZINE HYDROCHLORIDE 10 MG/1
1 TABLET ORAL
Qty: 0 | Refills: 0 | DISCHARGE

## 2021-06-22 RX ORDER — OXYTOCIN 10 UNIT/ML
333.33 VIAL (ML) INJECTION
Qty: 20 | Refills: 0 | Status: COMPLETED | OUTPATIENT
Start: 2021-07-02 | End: 2021-07-02

## 2021-06-22 RX ORDER — SODIUM CHLORIDE 9 MG/ML
1000 INJECTION, SOLUTION INTRAVENOUS
Refills: 0 | Status: DISCONTINUED | OUTPATIENT
Start: 2021-07-02 | End: 2021-07-03

## 2021-06-22 RX ORDER — FAMOTIDINE 10 MG/ML
0 INJECTION INTRAVENOUS
Qty: 0 | Refills: 0 | DISCHARGE

## 2021-06-22 RX ORDER — CHLORHEXIDINE GLUCONATE 213 G/1000ML
1 SOLUTION TOPICAL ONCE
Refills: 0 | Status: DISCONTINUED | OUTPATIENT
Start: 2021-07-03 | End: 2021-07-05

## 2021-06-22 NOTE — OB PST NOTE - HISTORY OF PRESENT ILLNESS
36 yo  at [id number] presents with nausea, dizziness, sweats, higher FS after initiation of Novolin 14u two days ago for GDMA2. Patient reports previously diet controlled gestational diabetes with good postprandial control, but elevated fasting -110s. She was started on Novolin 14u qHS on Wednesday and reports not feeling well since. She reports developing an itchy rash on her inner thigh along her bikini line after starting Novolin. She reports starting to feel unwell after Novolin administration before bed last night and random FS at 5AM was 116. She reports higher than usual FS despite insulin. Her fasting FS was 120 this morning and postprandial breakfast . She reports nausea, but able to tolerate oatmeal with yogurt, banana and almond butter for breakfast. Nausea currently improved. She also reports some lower abdominal pressure and cramping earlier, which has now resolved. She denies VB, LOF and endorses good FM.    FS in triage: 88   Patient last ate at 10AM     OBHx - pLTCS (2017), failed IOL for GDMA2, residual left flank pain since  GYNHx - s/p lsc L ovarian cystectomy, LEEP x2   PMHx - vertigo, migraines  PSHx - C/S x1, lsc ovarian cystectomy, LEEP x2, wisdom teeth removal, L foot and L knee sx  Meds - PNV, DHA, miralax, B2, Mg   Allergies - narcotics with anaphylaxis to higher dose of narcotics per patient   Social - denies tobacco, alcohol, recreational drugs  Psych - denies anxiety, depression    36 yo  at [id number] present    OBHx - pLTCS (), failed IOL for GDMA2, residual left flank pain since  GYNHx - s/p lsc L ovarian cystectomy, LEEP x2   PMHx - vertigo, migraines  PSHx - C/S x1, lsc ovarian cystectomy, LEEP x2, wisdom teeth removal, L foot and L knee sx  Meds - PNV, DHA, miralax, B2, Mg   Allergies - narcotics with anaphylaxis to higher dose of narcotics per patient   Social - denies tobacco, alcohol, recreational drugs  Psych - denies anxiety, depression    34 y/o female  with hx of vertigo, migraine , gestational diabetes  presents to PST evaluation for a scheduled Repeat C -Section with Rishi Baires on 2021     Denies Recent travel, Exposure or Covid symptoms  Covid PCR Test on 2021       36 y/o female with multiple allergies ,   with hx of vertigo, migraine , gestational diabetes  presents to PST evaluation for a scheduled Repeat C -Section with Rishi Baires on 2021     Denies Recent travel, Exposure or Covid symptoms  Covid PCR Test on 2021

## 2021-06-22 NOTE — OB PST NOTE - PROBLEM SELECTOR PLAN 1
Scheduled for Repeat  on 2021  Labs  Pre Op education discussed.  Check fingerstick , ABO on the  DOS

## 2021-06-22 NOTE — OB PST NOTE - PMH
Angioedema    Chronic pain  left side s/p    Angioedema    Chronic pain  left side s/p   H/O migraine    Vertigo

## 2021-06-22 NOTE — OB PST NOTE - ASSESSMENT
TEETEEI VTE 2.0 SCORE [CLOT updated 2019]    AGE RELATED RISK FACTORS                                                       MOBILITY RELATED FACTORS  [ ] Age 41-60 years                                            (1 Point)                    [ ] Bed rest                                                        (1 Point)  [ ] Age: 61-74 years                                           (2 Points)                  [ ] Plaster cast                                                   (2 Points)  [ ] Age= 75 years                                              (3 Points)                    [ ] Bed bound for more than 72 hours                 (2 Points)    DISEASE RELATED RISK FACTORS                                               GENDER SPECIFIC FACTORS  [ ] Edema in the lower extremities                       (1 Point)              [ x] Pregnancy                                                     (1 Point)  [ ] Varicose veins                                               (1 Point)                     [ ] Post-partum < 6 weeks                                   (1 Point)             [x ] BMI > 25 Kg/m2                                            (1 Point)                     [ ] Hormonal therapy  or oral contraception          (1 Point)                 [ ] Sepsis (in the previous month)                        (1 Point)               [ ] History of pregnancy complications                 (1 point)  [ ] Pneumonia or serious lung disease                                               [ ] Unexplained or recurrent                     (1 Point)           (in the previous month)                               (1 Point)  [ ] Abnormal pulmonary function test                     (1 Point)                 SURGERY RELATED RISK FACTORS  [ ] Acute myocardial infarction                              (1 Point)               [ x]  Section                                             (1 Point)  [ ] Congestive heart failure (in the previous month)  (1 Point)      [ ] Minor surgery                                                  (1 Point)   [ ] Inflammatory bowel disease                             (1 Point)               [ ] Arthroscopic surgery                                        (2 Points)  [ ] Central venous access                                      (2 Points)                [ ] General surgery lasting more than 45 minutes (2 points)  [ ] Malignancy- Present or previous                   (2 Points)                [ ] Elective arthroplasty                                         (5 points)    [ ] Stroke (in the previous month)                          (5 Points)                                                                                                                                                           HEMATOLOGY RELATED FACTORS                                                 TRAUMA RELATED RISK FACTORS  [ ] Prior episodes of VTE                                     (3 Points)                [ ] Fracture of the hip, pelvis, or leg                       (5 Points)  [ ] Positive family history for VTE                         (3 Points)             [ ] Acute spinal cord injury (in the previous month)  (5 Points)  [ ] Prothrombin 42039 A                                     (3 Points)               [ ] Paralysis  (less than 1 month)                             (5 Points)  [ ] Factor V Leiden                                             (3 Points)                  [ ] Multiple Trauma within 1 month                        (5 Points)  [ ] Lupus anticoagulants                                     (3 Points)                                                           [ ] Anticardiolipin antibodies                               (3 Points)                                                       [ ] High homocysteine in the blood                      (3 Points)                                             [ ] Other congenital or acquired thrombophilia      (3 Points)                                                [ ] Heparin induced thrombocytopenia                  (3 Points)                                     Total Score [  3        ]

## 2021-06-22 NOTE — OB PST NOTE - ALERT: PERTINENT HISTORY
NST 'S week;y for gestational DM/1st Trimester Sonogram/20 Week Level II Sonogram/Follow up Sonogram for Growth/Fetal Non-Stress Test (NST) NST 'S  weekl;y  for gestational DM/1st Trimester Sonogram/20 Week Level II Sonogram/Follow up Sonogram for Growth/Fetal Non-Stress Test (NST) NST 'S  weekly   for gestational DM/1st Trimester Sonogram/20 Week Level II Sonogram/Follow up Sonogram for Growth/Fetal Non-Stress Test (NST)

## 2021-06-22 NOTE — OB PST NOTE - NSHPPHYSICALEXAM_GEN_ALL_CORE
Physical Exam:   General: sitting comfortably in bed, NAD, AOx3   CV: RRR    Lungs: CTA b/l, good air flow b/l   Back: No CVA tenderness  Abd: soft, gravid, nontender, no rebound or guarding   Pelvic: mild erythema along inner thigh along bikini line consistent with fungal rash   Ext: NT b/l, no edema All negative

## 2021-06-22 NOTE — OB PST NOTE - NS_OBGYNHISTORY_OBGYN_ALL_OB_FT
OBHx - pLTCS (2017), failed IOL for GDMA2, residual left flank pain since OBHx - pLTCS (2017), failed IOL for GDMA2, residual left flank pain since    Cyst removed 2012 OBHx - pLTCS (2017), failed IOL for GDMA2, residual left flank pain since  GYNHx - s/p lsc L ovarian cystectomy, LEEP x2

## 2021-06-22 NOTE — OB PST NOTE - NSHPREVIEWOFSYSTEMS_GEN_ALL_CORE
NO chest pain, SOB, HSU, PND, orthopnea, palpitations, diaphoresis, lightheadedness, dizziness, syncope, increased lowEr extremity edema, fever chills, malaise, myalgias, anorexia, weight changes ( loss or gain), night sweats, generalized fatigue abdominal pain, N/V/C/D BRBPR, melena, urinary symptoms, cough, and wheezing.

## 2021-06-23 ENCOUNTER — APPOINTMENT (OUTPATIENT)
Dept: OBGYN | Facility: CLINIC | Age: 36
End: 2021-06-23
Payer: COMMERCIAL

## 2021-06-23 PROCEDURE — 99072 ADDL SUPL MATRL&STAF TM PHE: CPT

## 2021-06-23 PROCEDURE — 0502F SUBSEQUENT PRENATAL CARE: CPT

## 2021-06-23 PROCEDURE — 76818 FETAL BIOPHYS PROFILE W/NST: CPT

## 2021-06-24 ENCOUNTER — FORM ENCOUNTER (OUTPATIENT)
Age: 36
End: 2021-06-24

## 2021-06-24 PROBLEM — R42 DIZZINESS AND GIDDINESS: Chronic | Status: ACTIVE | Noted: 2021-06-22

## 2021-06-24 PROBLEM — Z86.69 PERSONAL HISTORY OF OTHER DISEASES OF THE NERVOUS SYSTEM AND SENSE ORGANS: Chronic | Status: ACTIVE | Noted: 2021-06-22

## 2021-06-28 ENCOUNTER — FORM ENCOUNTER (OUTPATIENT)
Age: 36
End: 2021-06-28

## 2021-06-29 ENCOUNTER — APPOINTMENT (OUTPATIENT)
Dept: OBGYN | Facility: CLINIC | Age: 36
End: 2021-06-29
Payer: COMMERCIAL

## 2021-06-29 PROCEDURE — 0502F SUBSEQUENT PRENATAL CARE: CPT

## 2021-06-29 PROCEDURE — 76818 FETAL BIOPHYS PROFILE W/NST: CPT

## 2021-06-29 PROCEDURE — 76816 OB US FOLLOW-UP PER FETUS: CPT

## 2021-06-29 PROCEDURE — 99072 ADDL SUPL MATRL&STAF TM PHE: CPT

## 2021-06-30 ENCOUNTER — OUTPATIENT (OUTPATIENT)
Dept: OUTPATIENT SERVICES | Facility: HOSPITAL | Age: 36
LOS: 1 days | End: 2021-06-30
Payer: COMMERCIAL

## 2021-06-30 ENCOUNTER — FORM ENCOUNTER (OUTPATIENT)
Age: 36
End: 2021-06-30

## 2021-06-30 DIAGNOSIS — Z11.52 ENCOUNTER FOR SCREENING FOR COVID-19: ICD-10-CM

## 2021-06-30 DIAGNOSIS — Z98.890 OTHER SPECIFIED POSTPROCEDURAL STATES: Chronic | ICD-10-CM

## 2021-06-30 DIAGNOSIS — Z98.891 HISTORY OF UTERINE SCAR FROM PREVIOUS SURGERY: Chronic | ICD-10-CM

## 2021-06-30 LAB — SARS-COV-2 RNA SPEC QL NAA+PROBE: SIGNIFICANT CHANGE UP

## 2021-06-30 PROCEDURE — C9803: CPT

## 2021-06-30 PROCEDURE — U0005: CPT

## 2021-06-30 PROCEDURE — U0003: CPT

## 2021-07-01 ENCOUNTER — TRANSCRIPTION ENCOUNTER (OUTPATIENT)
Age: 36
End: 2021-07-01

## 2021-07-02 ENCOUNTER — INPATIENT (INPATIENT)
Facility: HOSPITAL | Age: 36
LOS: 2 days | Discharge: HOME | End: 2021-07-05
Attending: OBSTETRICS & GYNECOLOGY | Admitting: OBSTETRICS & GYNECOLOGY
Payer: COMMERCIAL

## 2021-07-02 VITALS — DIASTOLIC BLOOD PRESSURE: 69 MMHG | HEART RATE: 75 BPM | SYSTOLIC BLOOD PRESSURE: 105 MMHG

## 2021-07-02 DIAGNOSIS — Z01.818 ENCOUNTER FOR OTHER PREPROCEDURAL EXAMINATION: ICD-10-CM

## 2021-07-02 DIAGNOSIS — O34.211 MATERNAL CARE FOR LOW TRANSVERSE SCAR FROM PREVIOUS CESAREAN DELIVERY: ICD-10-CM

## 2021-07-02 DIAGNOSIS — Z98.891 HISTORY OF UTERINE SCAR FROM PREVIOUS SURGERY: Chronic | ICD-10-CM

## 2021-07-02 DIAGNOSIS — Z98.890 OTHER SPECIFIED POSTPROCEDURAL STATES: Chronic | ICD-10-CM

## 2021-07-02 LAB
BLD GP AB SCN SERPL QL: NEGATIVE — SIGNIFICANT CHANGE UP
COVID-19 SPIKE DOMAIN AB INTERP: NEGATIVE — SIGNIFICANT CHANGE UP
COVID-19 SPIKE DOMAIN ANTIBODY RESULT: 0.4 U/ML — SIGNIFICANT CHANGE UP
GLUCOSE BLDC GLUCOMTR-MCNC: 84 MG/DL — SIGNIFICANT CHANGE UP (ref 70–99)
RH IG SCN BLD-IMP: POSITIVE — SIGNIFICANT CHANGE UP
SARS-COV-2 IGG+IGM SERPL QL IA: 0.4 U/ML — SIGNIFICANT CHANGE UP
SARS-COV-2 IGG+IGM SERPL QL IA: NEGATIVE — SIGNIFICANT CHANGE UP
T PALLIDUM AB TITR SER: NEGATIVE — SIGNIFICANT CHANGE UP

## 2021-07-02 PROCEDURE — 59510 CESAREAN DELIVERY: CPT

## 2021-07-02 PROCEDURE — 59514 CESAREAN DELIVERY ONLY: CPT | Mod: AS,U9

## 2021-07-02 RX ORDER — KETOROLAC TROMETHAMINE 30 MG/ML
30 SYRINGE (ML) INJECTION EVERY 6 HOURS
Refills: 0 | Status: COMPLETED | OUTPATIENT
Start: 2021-07-02 | End: 2021-07-04

## 2021-07-02 RX ORDER — DIPHENHYDRAMINE HCL 50 MG
25 CAPSULE ORAL EVERY 4 HOURS
Refills: 0 | Status: DISCONTINUED | OUTPATIENT
Start: 2021-07-03 | End: 2021-07-05

## 2021-07-02 RX ORDER — TETANUS TOXOID, REDUCED DIPHTHERIA TOXOID AND ACELLULAR PERTUSSIS VACCINE, ADSORBED 5; 2.5; 8; 8; 2.5 [IU]/.5ML; [IU]/.5ML; UG/.5ML; UG/.5ML; UG/.5ML
0.5 SUSPENSION INTRAMUSCULAR ONCE
Refills: 0 | Status: DISCONTINUED | OUTPATIENT
Start: 2021-07-02 | End: 2021-07-05

## 2021-07-02 RX ORDER — ONDANSETRON 8 MG/1
4 TABLET, FILM COATED ORAL EVERY 6 HOURS
Refills: 0 | Status: DISCONTINUED | OUTPATIENT
Start: 2021-07-03 | End: 2021-07-05

## 2021-07-02 RX ORDER — SIMETHICONE 80 MG/1
80 TABLET, CHEWABLE ORAL EVERY 4 HOURS
Refills: 0 | Status: DISCONTINUED | OUTPATIENT
Start: 2021-07-02 | End: 2021-07-05

## 2021-07-02 RX ORDER — LANOLIN
1 OINTMENT (GRAM) TOPICAL EVERY 6 HOURS
Refills: 0 | Status: DISCONTINUED | OUTPATIENT
Start: 2021-07-02 | End: 2021-07-05

## 2021-07-02 RX ORDER — CEFAZOLIN SODIUM 1 G
2000 VIAL (EA) INJECTION ONCE
Refills: 0 | Status: COMPLETED | OUTPATIENT
Start: 2021-07-02 | End: 2021-07-02

## 2021-07-02 RX ORDER — IBUPROFEN 200 MG
600 TABLET ORAL EVERY 6 HOURS
Refills: 0 | Status: COMPLETED | OUTPATIENT
Start: 2021-07-02 | End: 2022-05-31

## 2021-07-02 RX ORDER — MAGNESIUM HYDROXIDE 400 MG/1
30 TABLET, CHEWABLE ORAL
Refills: 0 | Status: DISCONTINUED | OUTPATIENT
Start: 2021-07-02 | End: 2021-07-05

## 2021-07-02 RX ORDER — OXYTOCIN 10 UNIT/ML
333.33 VIAL (ML) INJECTION
Qty: 20 | Refills: 0 | Status: DISCONTINUED | OUTPATIENT
Start: 2021-07-02 | End: 2021-07-05

## 2021-07-02 RX ORDER — SODIUM CHLORIDE 9 MG/ML
1000 INJECTION, SOLUTION INTRAVENOUS
Refills: 0 | Status: DISCONTINUED | OUTPATIENT
Start: 2021-07-02 | End: 2021-07-05

## 2021-07-02 RX ORDER — ROPIVACAINE HCL/PF 5 MG/ML
200 AMPUL (ML) INJECTION
Refills: 0 | Status: DISCONTINUED | OUTPATIENT
Start: 2021-07-02 | End: 2021-07-03

## 2021-07-02 RX ORDER — CITRIC ACID/SODIUM CITRATE 300-500 MG
15 SOLUTION, ORAL ORAL ONCE
Refills: 0 | Status: DISCONTINUED | OUTPATIENT
Start: 2021-07-02 | End: 2021-07-03

## 2021-07-02 RX ORDER — FAMOTIDINE 10 MG/ML
20 INJECTION INTRAVENOUS ONCE
Refills: 0 | Status: COMPLETED | OUTPATIENT
Start: 2021-07-02 | End: 2021-07-02

## 2021-07-02 RX ORDER — SODIUM CHLORIDE 9 MG/ML
1000 INJECTION, SOLUTION INTRAVENOUS ONCE
Refills: 0 | Status: COMPLETED | OUTPATIENT
Start: 2021-07-02 | End: 2021-07-02

## 2021-07-02 RX ORDER — ACETAMINOPHEN 500 MG
1000 TABLET ORAL ONCE
Refills: 0 | Status: COMPLETED | OUTPATIENT
Start: 2021-07-02 | End: 2021-07-02

## 2021-07-02 RX ORDER — DIPHENHYDRAMINE HCL 50 MG
25 CAPSULE ORAL EVERY 6 HOURS
Refills: 0 | Status: DISCONTINUED | OUTPATIENT
Start: 2021-07-02 | End: 2021-07-05

## 2021-07-02 RX ORDER — ACETAMINOPHEN 500 MG
975 TABLET ORAL
Refills: 0 | Status: DISCONTINUED | OUTPATIENT
Start: 2021-07-02 | End: 2021-07-05

## 2021-07-02 RX ORDER — ROPIVACAINE HCL/PF 5 MG/ML
4 AMPUL (ML) INJECTION
Refills: 0 | Status: DISCONTINUED | OUTPATIENT
Start: 2021-07-02 | End: 2021-07-03

## 2021-07-02 RX ORDER — DEXAMETHASONE 0.5 MG/5ML
4 ELIXIR ORAL EVERY 6 HOURS
Refills: 0 | Status: DISCONTINUED | OUTPATIENT
Start: 2021-07-03 | End: 2021-07-05

## 2021-07-02 RX ORDER — HEPARIN SODIUM 5000 [USP'U]/ML
5000 INJECTION INTRAVENOUS; SUBCUTANEOUS EVERY 12 HOURS
Refills: 0 | Status: DISCONTINUED | OUTPATIENT
Start: 2021-07-02 | End: 2021-07-05

## 2021-07-02 RX ADMIN — Medication 200 MILLILITER(S): at 19:35

## 2021-07-02 RX ADMIN — SODIUM CHLORIDE 2000 MILLILITER(S): 9 INJECTION, SOLUTION INTRAVENOUS at 13:08

## 2021-07-02 RX ADMIN — Medication 30 MILLIGRAM(S): at 16:01

## 2021-07-02 RX ADMIN — Medication 1000 MILLIUNIT(S)/MIN: at 17:18

## 2021-07-02 RX ADMIN — Medication 30 MILLIGRAM(S): at 22:23

## 2021-07-02 RX ADMIN — Medication 400 MILLIGRAM(S): at 17:40

## 2021-07-02 RX ADMIN — FAMOTIDINE 20 MILLIGRAM(S): 10 INJECTION INTRAVENOUS at 13:08

## 2021-07-02 RX ADMIN — Medication 200 MILLILITER(S): at 17:16

## 2021-07-02 RX ADMIN — Medication 100 MILLIGRAM(S): at 14:13

## 2021-07-02 NOTE — OB PROVIDER H&P - ASSESSMENT
A/P: 36 y/o G 2 P 1 @ 39.5 wks admitted for scheduled rLTCS.   - Admit to L&D  - Routine labs, diabetic IVF, NPO  - GDMA2: FS Q4hrs  - Reglan/Pepcid/Bicitra  - Abdominal prep, PAS, ye to bedside drainage  - EFM: reactive  - GBS: negative  - EFW: 3600g  - Anesthesia consult  - Dr. Norris aware    ANNAMARIE TayC  A/P: 34 y/o G 2 P 1 @ 39.5 wks admitted for scheduled rLTCS.   - Admit to L&D  - Routine labs, diabetic IVF, NPO  - GDMA2: FS Q4hrs  - Reglan/Pepcid/Bicitra  - Abdominal prep, PAS, ye to bedside drainage  - EFM: reactive  - GBS: negative  - EFW: 3600g  - Anesthesia consult  - Dr. San aware    Nora Kauffman PA-C

## 2021-07-02 NOTE — OB RN INTRAOPERATIVE NOTE - NSSELHIDDEN_OBGYN_ALL_OB_FT
[NS_[email or web address]_ALL_OB_FT:[id number]],[NS_DeliveryAssist1_OBGYN_ALL_OB_FT:[id number]],[NS_DeliveryRN_OBGYN_ALL_OB_FT:[id number]]

## 2021-07-02 NOTE — OB PROVIDER DELIVERY SUMMARY - NSVAGINALEXAMCERT_OBGYN_ALL_OB
Infant (Birth) The Delivery OB Provider certifies that vaginal examination and/or abdominal examination after the delivery was done and no foreign body was found.

## 2021-07-02 NOTE — OB RN PATIENT PROFILE - ALERT: PERTINENT HISTORY
NST 'S  weekly   for gestational DM/1st Trimester Sonogram/20 Week Level II Sonogram/Follow up Sonogram for Growth/Fetal Non-Stress Test (NST)

## 2021-07-02 NOTE — OB PROVIDER DELIVERY SUMMARY - NSSELHIDDEN_OBGYN_ALL_OB_FT
[NS_DeliveryAttending1_OBGYN_ALL_OB_FT:[id number]],[NS_DeliveryAssist1_OBGYN_ALL_OB_FT:[id number]],[NS_DeliveryRN_OBGYN_ALL_OB_FT:[id number]]

## 2021-07-02 NOTE — OB RN PATIENT PROFILE - NS_OBGYNHISTORY_OBGYN_ALL_OB_FT
OBHx - pLTCS (2017), failed IOL for GDMA2, residual left flank pain since  GYNHx - s/p lsc L ovarian cystectomy, LEEP x2  cLOMID pregnancy

## 2021-07-02 NOTE — OB RN DELIVERY SUMMARY - NSSELHIDDEN_OBGYN_ALL_OB_FT
[NS_DeliveryAttending1_OBGYN_ALL_OB_FT:[id number]],[NS_DeliveryAssist1_OBGYN_ALL_OB_FT:[id number]],[NS_DeliveryRN_OBGYN_ALL_OB_FT:[id number]==]

## 2021-07-02 NOTE — OB PROVIDER H&P - NSHPPHYSICALEXAM_GEN_ALL_CORE
Vital Signs Last 24 Hrs  T(C): --  T(F): --  HR: 75 (02 Jul 2021 12:10) (75 - 75)  BP: 105/69 (02 Jul 2021 12:10) (105/69 - 105/69)  BP(mean): --  RR: --  SpO2: --  Gen: NAD  CV: NRRR  Lungs: CTA  Abd: soft, gravid, non-tender  SVE: deferred   EFM: 125bpm, moderate variability, +accels, -decels  [place]: irregular, infrequent

## 2021-07-02 NOTE — OB PROVIDER H&P - HISTORY OF PRESENT ILLNESS
OB PA Admission Note    36 y/o  @39.5wks (AYAAN ) admitted for scheduled rLTCS. Denies contractions, LOF, VB. +FM. GBS negative. EFW 3600g.     PNC: GDMA2- Novalin 14U QHS  OBHx - pLTCS (), failed IOL for GDMA2, residual left flank pain since  GYNHx - s/p lsc L ovarian cystectomy, LEEP x2   PMHx - vertigo, migraines  PSHx - C/S x1, lsc ovarian cystectomy, LEEP x2, wisdom teeth removal, L foot and L knee sx  Meds - PNV, DHA, miralax, B2, Mg   Allergies - narcotics with anaphylaxis to higher dose of narcotics per patient   Social - denies tobacco, alcohol, recreational drugs  Psych - denies anxiety, depression      OB PA Admission Note    36 y/o  @39.5wks (AYAAN ) admitted for scheduled rLTCS. Denies contractions, LOF, VB. +FM. GBS negative. EFW 3800g. Last PO intake 8pm last night.     PNC: GDMA2- Levemir 70 U QD  OBHx - pLTCS (), failed IOL for GDMA2, residual left flank pain since  GYNHx - s/p lsc L ovarian cystectomy, LEEP x2   PMHx - vertigo, migraines  PSHx - C/S x1, lsc ovarian cystectomy, LEEP x2, wisdom teeth removal, L foot and L knee sx  Meds - PNV, DHA, miralax, B2, Mg   Allergies - narcotics with anaphylaxis to higher dose of narcotics per patient   Social - denies tobacco, alcohol, recreational drugs  Psych - denies anxiety, depression      OB PA Admission Note    34 y/o  @39.5wks (AYAAN ) admitted for scheduled rLTCS. Denies contractions, LOF, VB. +FM. GBS positive. EFW 3800g. Last PO intake 8pm last night.     PNC: GDMA2- Levemir 70 U QD  OBHx - pLTCS (), failed IOL for GDMA2, residual left flank pain since  GYNHx - s/p lsc L ovarian cystectomy, LEEP x2   PMHx - vertigo, migraines  PSHx - C/S x1, lsc ovarian cystectomy, LEEP x2, wisdom teeth removal, L foot and L knee sx  Meds - PNV, DHA, miralax, B2, Mg   Allergies - narcotics with anaphylaxis to higher dose of narcotics per patient   Social - denies tobacco, alcohol, recreational drugs  Psych - denies anxiety, depression

## 2021-07-02 NOTE — OB RN DELIVERY SUMMARY - NS_SEPSISRSKCALC_OBGYN_ALL_OB_FT
EOS calculated successfully. EOS Risk Factor: 0.5/1000 live births ([facility] national incidence); GA=39w5d; Temp=97.7; ROM=0; GBS='Positive'; Antibiotics='No antibiotics or any antibiotics < 2 hrs prior to birth'

## 2021-07-02 NOTE — OB NEONATOLOGY/PEDIATRICIAN DELIVERY SUMMARY - NSPEDSNEONOTESA_OBGYN_ALL_OB_FT
Baby girl born at 39.5 wks via scheduled repeat CS to a 36 y/o  blood type A+ mother. Maternal history of multiple food and medication allergies, GDMA2 on insulin. No significant prenatal history. PNL nr/immune/-, GBS unknown, no rupture, no labor. AROM at delivery with clear fluids. Baby emerged vigorous, crying, was w/d/s/s with APGARS of 9/9. Mom would like to breast feed, declines Hep B. EOS not calculated, no rupture, no labor.

## 2021-07-02 NOTE — OB PROVIDER DELIVERY SUMMARY - NSPROVIDERDELIVERYNOTE_OBGYN_ALL_OB_FT
scheduled rLTCS   Viable female infant   APGARS 9/9  Cord blood collected   Hysterotomy closed in one layer using caprosyn  Intercede placed atop hysterotomy  Grossly normal uterus, tubes, and ovaries  Abdomen closed in standard fashion  Pt and infant to recovery in stable condition  EBL: 1000cc  IVF: 1300cc  UOP: 200cc    Nora Kauffman PA-C

## 2021-07-02 NOTE — PRE-ANESTHESIA EVALUATION ADULT - NSANTHADDINFOFT_GEN_ALL_CORE
Lidocaine makes the patient syncopize. Able to receive other local anesthetic agents. Will perform CSE with bupivacaine and no narcotics.  Post op tylenol and toradol for pain. Supplement with ketamine or precedex PRN

## 2021-07-03 LAB
BASOPHILS # BLD AUTO: 0.02 K/UL — SIGNIFICANT CHANGE UP (ref 0–0.2)
BASOPHILS NFR BLD AUTO: 0.2 % — SIGNIFICANT CHANGE UP (ref 0–2)
EOSINOPHIL # BLD AUTO: 0.02 K/UL — SIGNIFICANT CHANGE UP (ref 0–0.5)
EOSINOPHIL NFR BLD AUTO: 0.2 % — SIGNIFICANT CHANGE UP (ref 0–6)
HCT VFR BLD CALC: 28.5 % — LOW (ref 34.5–45)
HGB BLD-MCNC: 9.4 G/DL — LOW (ref 11.5–15.5)
IMM GRANULOCYTES NFR BLD AUTO: 0.7 % — SIGNIFICANT CHANGE UP (ref 0–1.5)
LYMPHOCYTES # BLD AUTO: 1.89 K/UL — SIGNIFICANT CHANGE UP (ref 1–3.3)
LYMPHOCYTES # BLD AUTO: 16.9 % — SIGNIFICANT CHANGE UP (ref 13–44)
MCHC RBC-ENTMCNC: 30.5 PG — SIGNIFICANT CHANGE UP (ref 27–34)
MCHC RBC-ENTMCNC: 33 GM/DL — SIGNIFICANT CHANGE UP (ref 32–36)
MCV RBC AUTO: 92.5 FL — SIGNIFICANT CHANGE UP (ref 80–100)
MONOCYTES # BLD AUTO: 0.55 K/UL — SIGNIFICANT CHANGE UP (ref 0–0.9)
MONOCYTES NFR BLD AUTO: 4.9 % — SIGNIFICANT CHANGE UP (ref 2–14)
NEUTROPHILS # BLD AUTO: 8.65 K/UL — HIGH (ref 1.8–7.4)
NEUTROPHILS NFR BLD AUTO: 77.1 % — HIGH (ref 43–77)
NRBC # BLD: 0 /100 WBCS — SIGNIFICANT CHANGE UP (ref 0–0)
PLATELET # BLD AUTO: 160 K/UL — SIGNIFICANT CHANGE UP (ref 150–400)
RBC # BLD: 3.08 M/UL — LOW (ref 3.8–5.2)
RBC # FLD: 14.9 % — HIGH (ref 10.3–14.5)
WBC # BLD: 11.21 K/UL — HIGH (ref 3.8–10.5)
WBC # FLD AUTO: 11.21 K/UL — HIGH (ref 3.8–10.5)

## 2021-07-03 RX ORDER — IBUPROFEN 200 MG
600 TABLET ORAL EVERY 6 HOURS
Refills: 0 | Status: DISCONTINUED | OUTPATIENT
Start: 2021-07-03 | End: 2021-07-05

## 2021-07-03 RX ORDER — FERROUS SULFATE 325(65) MG
325 TABLET ORAL
Refills: 0 | Status: DISCONTINUED | OUTPATIENT
Start: 2021-07-03 | End: 2021-07-05

## 2021-07-03 RX ORDER — ACETAMINOPHEN 500 MG
1000 TABLET ORAL ONCE
Refills: 0 | Status: COMPLETED | OUTPATIENT
Start: 2021-07-03 | End: 2021-07-03

## 2021-07-03 RX ORDER — ASCORBIC ACID 60 MG
500 TABLET,CHEWABLE ORAL
Refills: 0 | Status: DISCONTINUED | OUTPATIENT
Start: 2021-07-03 | End: 2021-07-05

## 2021-07-03 RX ADMIN — Medication 975 MILLIGRAM(S): at 22:19

## 2021-07-03 RX ADMIN — Medication 1000 MILLIGRAM(S): at 09:15

## 2021-07-03 RX ADMIN — Medication 975 MILLIGRAM(S): at 04:07

## 2021-07-03 RX ADMIN — Medication 30 MILLIGRAM(S): at 18:15

## 2021-07-03 RX ADMIN — Medication 975 MILLIGRAM(S): at 15:04

## 2021-07-03 RX ADMIN — Medication 30 MILLIGRAM(S): at 12:00

## 2021-07-03 RX ADMIN — Medication 975 MILLIGRAM(S): at 03:22

## 2021-07-03 RX ADMIN — HEPARIN SODIUM 5000 UNIT(S): 5000 INJECTION INTRAVENOUS; SUBCUTANEOUS at 03:22

## 2021-07-03 RX ADMIN — Medication 975 MILLIGRAM(S): at 21:55

## 2021-07-03 RX ADMIN — Medication 30 MILLIGRAM(S): at 05:30

## 2021-07-03 RX ADMIN — HEPARIN SODIUM 5000 UNIT(S): 5000 INJECTION INTRAVENOUS; SUBCUTANEOUS at 15:04

## 2021-07-03 RX ADMIN — Medication 30 MILLIGRAM(S): at 04:47

## 2021-07-03 RX ADMIN — HEPARIN SODIUM 5000 UNIT(S): 5000 INJECTION INTRAVENOUS; SUBCUTANEOUS at 03:23

## 2021-07-03 RX ADMIN — Medication 30 MILLIGRAM(S): at 18:47

## 2021-07-03 RX ADMIN — Medication 30 MILLIGRAM(S): at 11:28

## 2021-07-03 RX ADMIN — Medication 975 MILLIGRAM(S): at 15:35

## 2021-07-03 RX ADMIN — SIMETHICONE 80 MILLIGRAM(S): 80 TABLET, CHEWABLE ORAL at 11:24

## 2021-07-03 RX ADMIN — Medication 600 MILLIGRAM(S): at 23:46

## 2021-07-03 RX ADMIN — SIMETHICONE 80 MILLIGRAM(S): 80 TABLET, CHEWABLE ORAL at 16:07

## 2021-07-03 RX ADMIN — Medication 400 MILLIGRAM(S): at 08:45

## 2021-07-03 NOTE — DIETITIAN INITIAL EVALUATION ADULT. - ADD RECOMMEND
1) Continue to monitor intake, weight, labs, GI tolerance, skin integrity  2) Provide food preferences within dietary restrictions when feasible   3) Encourage good PO intake 4) Reinforce diet education (as above) prn

## 2021-07-03 NOTE — DIETITIAN INITIAL EVALUATION ADULT. - OTHER CALCULATIONS
148% IBW based upon pre-pregnancy wt 170 pounds. Energy/fluid based upon pre-pregnancy wt 170 pounds. Protein based upon  pounds

## 2021-07-03 NOTE — PROGRESS NOTE ADULT - ASSESSMENT
A/P: 36yo POD#1 s/p LTCS.  Patient is stable and doing well post-operatively.    - Continue regular diet.  - Increase ambulation.  - Continue motrin, tylenol, oxycodone PRN for pain control.  vs. continue PCEA for pain.  - F/u AM CBC    Isidra Bloom, PGY1 A/P: 36yo POD#1 s/p LTCS.  Patient is stable and doing well post-operatively.    - Trial regular diet.  - Increase ambulation.  - Continue motrin, tylenol, PRN for pain control. If severe breakthrough pain occurs, consider tramadol.   -TOV when ye is removed  at 6am.   - F/u AM CBC    Isidra Bloom, PGY1

## 2021-07-03 NOTE — DIETITIAN INITIAL EVALUATION ADULT. - OTHER INFO
Reports good appetite & PO intakes thus far. Denied hx of chewing/swallowing difficulties. Denies nausea/emesis. No BM thus far. Seen at bedside breastfeeding with plan to continue post-discharge    Educated patient on need to follow-up for 2-hour oral glucose tolerance test 4-12 weeks after delivery. Advised patient that she no longer needs to check fingersticks at home but that she is at increased risk for developing T2DM due to GDM. Encouraged gradual wt loss with daily physical activity when medically feasible. Reviewed benefits of healthy diet/lifestyle in reducing risk for T2DM. Explained that breastfeeding decreases the risk for developing T2DM. Pt encouraged to continue prenatal MVI while breastfeeding and that she will require more protein/calories/fluid while breastfeeding. Encouraged pt to liberalize diet but continue to include protein with carbohydrates during meals/snacks. Provided with post partum GDM education handout

## 2021-07-03 NOTE — DIETITIAN INITIAL EVALUATION ADULT. - NS FNS WEIGHT CHANGE REASON
Pt reports pre-pregnancy wt of 170 pounds. Reports weight gain of ~50 pounds during the course of her pregnancy to pre-delivery weight of 220 pounds/intentional

## 2021-07-03 NOTE — DIETITIAN INITIAL EVALUATION ADULT. - ORAL INTAKE PTA/DIET HISTORY
Pt endorsed a good appetite & PO intakes PTA. States she was diagnosed with GDM both during this & previous pregnancy. Prescribed Levemir 70units 1x daily. SMBG 4x daily (fasting 90s; 1hr post prandial <140). Followed a consistent CHO diet at home. Reports sensitivity to soy due to estrogen sensitivity (more sensitive to items soy based; NOT an issue with food items with trace amounts of soy). Reports taking vitamin B2, magnesium, probiotic, prenatal multivitamin daily @ home

## 2021-07-03 NOTE — DIETITIAN INITIAL EVALUATION ADULT. - PERSON TAUGHT/METHOD
Educated patient on need to follow-up for 2-hour oral glucose tolerance test 4-12 weeks after delivery. Advised patient that she no longer needs to check fingersticks at home but that she is at increased risk for developing T2DM due to GDM. Encouraged gradual wt loss with daily physical activity when medically feasible. Reviewed benefits of healthy diet/lifestyle in reducing risk for T2DM. Explained that breastfeeding decreases the risk for developing T2DM. Pt encouraged to continue prenatal MVI while breastfeeding and that she will require more protein/calories/fluid while breastfeeding. Encouraged pt to liberalize diet but continue to include protein with carbohydrates during meals/snacks. Provided with post partum GDM education handout/verbal instruction/written material/patient instructed/spouse instructed

## 2021-07-03 NOTE — PROGRESS NOTE ADULT - SUBJECTIVE AND OBJECTIVE BOX
OB Progress Note:  Delivery, POD#1    S: 36yo POD#1 s/p LTCS . Her pain is well controlled. She is tolerating a regular diet and passing flatus. Denies N/V. Denies CP/SOB/lightheadedness/dizziness. She is ambulating without difficulty. Voiding spontanously.     O:   Vital Signs Last 24 Hrs  T(C): 36.8 (2021 01:15), Max: 36.8 (:15)  T(F): 98.2 (:15), Max: 98.2 (2021 01:15)  HR: 76 (2021 01:15) (60 - 86)  BP: 104/56 (2021 01:15) (96/50 - 134/57)  BP(mean): 76 (:15) (68 - 89)  RR: 18 (:15) (16 - 18)  SpO2: 95% (:15) (93% - 100%)    Labs:  Blood type: A Positive  Rubella IgG: RPR: Negative                    PE:  General: NAD  Abdomen: Mildly distended, appropriately tender, incision c/d/i.  Extremities: No erythema, no pitting edema       OB Progress Note:  Delivery, POD#1    S: 34yo POD#1 s/p rLTCS . Her pain is well controlled. She has not trialed food but is passing flatus. Denies N/V. Denies CP/SOB/lightheadedness/dizziness. She has yet to ambulate due to numbness in legs that has improved since she arrived to the floor. Patient's epidural was continued past delivery due to unique pain control regimen. Rodriguez remains in place.     O:   Vital Signs Last 24 Hrs  T(C): 36.8 (2021 01:15), Max: 36.8 (2021 01:15)  T(F): 98.2 (:15), Max: 98.2 (:15)  HR: 76 (2021 01:15) (60 - 86)  BP: 104/56 (2021 01:15) (96/50 - 134/57)  BP(mean): 76 (2021 01:15) (68 - 89)  RR: 18 (:15) (16 - 18)  SpO2: 95% (2021 01:15) (93% - 100%)    Labs:  Blood type: A Positive  Rubella IgG: RPR: Negative      PE:  General: NAD  Abdomen: Mildly distended, appropriately tender, incision c/d/i.  Extremities: No erythema, no pitting edema

## 2021-07-04 RX ORDER — POLYETHYLENE GLYCOL 3350 17 G/17G
17 POWDER, FOR SOLUTION ORAL EVERY 24 HOURS
Refills: 0 | Status: DISCONTINUED | OUTPATIENT
Start: 2021-07-04 | End: 2021-07-05

## 2021-07-04 RX ORDER — SIMETHICONE 80 MG/1
80 TABLET, CHEWABLE ORAL EVERY 4 HOURS
Refills: 0 | Status: DISCONTINUED | OUTPATIENT
Start: 2021-07-04 | End: 2021-07-05

## 2021-07-04 RX ADMIN — MAGNESIUM HYDROXIDE 30 MILLILITER(S): 400 TABLET, CHEWABLE ORAL at 08:18

## 2021-07-04 RX ADMIN — Medication 600 MILLIGRAM(S): at 00:45

## 2021-07-04 RX ADMIN — SIMETHICONE 80 MILLIGRAM(S): 80 TABLET, CHEWABLE ORAL at 08:07

## 2021-07-04 RX ADMIN — Medication 600 MILLIGRAM(S): at 00:15

## 2021-07-04 RX ADMIN — Medication 600 MILLIGRAM(S): at 18:21

## 2021-07-04 RX ADMIN — Medication 975 MILLIGRAM(S): at 09:30

## 2021-07-04 RX ADMIN — SIMETHICONE 80 MILLIGRAM(S): 80 TABLET, CHEWABLE ORAL at 03:02

## 2021-07-04 RX ADMIN — SIMETHICONE 80 MILLIGRAM(S): 80 TABLET, CHEWABLE ORAL at 18:21

## 2021-07-04 RX ADMIN — Medication 975 MILLIGRAM(S): at 03:33

## 2021-07-04 RX ADMIN — Medication 600 MILLIGRAM(S): at 23:47

## 2021-07-04 RX ADMIN — SIMETHICONE 80 MILLIGRAM(S): 80 TABLET, CHEWABLE ORAL at 23:48

## 2021-07-04 RX ADMIN — Medication 500 MILLIGRAM(S): at 18:21

## 2021-07-04 RX ADMIN — Medication 600 MILLIGRAM(S): at 06:01

## 2021-07-04 RX ADMIN — SIMETHICONE 80 MILLIGRAM(S): 80 TABLET, CHEWABLE ORAL at 12:15

## 2021-07-04 RX ADMIN — Medication 975 MILLIGRAM(S): at 08:56

## 2021-07-04 RX ADMIN — Medication 600 MILLIGRAM(S): at 13:00

## 2021-07-04 RX ADMIN — HEPARIN SODIUM 5000 UNIT(S): 5000 INJECTION INTRAVENOUS; SUBCUTANEOUS at 14:44

## 2021-07-04 RX ADMIN — MAGNESIUM HYDROXIDE 30 MILLILITER(S): 400 TABLET, CHEWABLE ORAL at 20:27

## 2021-07-04 RX ADMIN — Medication 600 MILLIGRAM(S): at 12:15

## 2021-07-04 RX ADMIN — HEPARIN SODIUM 5000 UNIT(S): 5000 INJECTION INTRAVENOUS; SUBCUTANEOUS at 03:04

## 2021-07-04 RX ADMIN — Medication 500 MILLIGRAM(S): at 06:02

## 2021-07-04 RX ADMIN — Medication 325 MILLIGRAM(S): at 18:22

## 2021-07-04 RX ADMIN — Medication 975 MILLIGRAM(S): at 03:03

## 2021-07-04 RX ADMIN — Medication 975 MILLIGRAM(S): at 20:18

## 2021-07-04 RX ADMIN — Medication 600 MILLIGRAM(S): at 06:38

## 2021-07-04 RX ADMIN — Medication 325 MILLIGRAM(S): at 06:38

## 2021-07-04 RX ADMIN — Medication 600 MILLIGRAM(S): at 19:00

## 2021-07-04 RX ADMIN — Medication 975 MILLIGRAM(S): at 20:55

## 2021-07-04 RX ADMIN — Medication 975 MILLIGRAM(S): at 15:30

## 2021-07-04 RX ADMIN — Medication 975 MILLIGRAM(S): at 14:44

## 2021-07-04 NOTE — PROGRESS NOTE ADULT - SUBJECTIVE AND OBJECTIVE BOX
OB Progress Note: LTCS, POD#2    S: 36yo POD#2 s/p LTCS. Pt reports diffuse pain throughout abdomen, worse at the incision site and with movement. Pt states she has chronic pain on her lateral abdomen. She is tolerating a regular diet and passing flatus. Pt reports significant gas pain at her baseline. She is voiding spontaneously, and ambulating without difficulty. Denies CP/SOB. Denies lightheadedness/dizziness. Denies N/V.    O:  Vitals:  Vital Signs Last 24 Hrs  T(C): 36.6 (04 Jul 2021 05:49), Max: 36.8 (03 Jul 2021 21:53)  T(F): 97.9 (04 Jul 2021 05:49), Max: 98.2 (03 Jul 2021 21:53)  HR: 75 (04 Jul 2021 05:49) (75 - 86)  BP: 113/72 (04 Jul 2021 05:49) (104/70 - 118/61)  BP(mean): --  RR: 18 (04 Jul 2021 05:49) (18 - 18)  SpO2: 98% (04 Jul 2021 05:49) (97% - 98%)    MEDICATIONS  (STANDING):  acetaminophen   Tablet .. 975 milliGRAM(s) Oral <User Schedule>  ascorbic acid 500 milliGRAM(s) Oral two times a day  chlorhexidine 2% Cloths 1 Application(s) Topical once  diphtheria/tetanus/pertussis (acellular) Vaccine (ADAcel) 0.5 milliLiter(s) IntraMuscular once  ferrous    sulfate 325 milliGRAM(s) Oral two times a day  heparin   Injectable 5000 Unit(s) SubCutaneous every 12 hours  ibuprofen  Tablet. 600 milliGRAM(s) Oral every 6 hours  ketorolac   Injectable 30 milliGRAM(s) IV Push every 6 hours  lactated ringers. 1000 milliLiter(s) (125 mL/Hr) IV Continuous <Continuous>  oxytocin Infusion 333.333 milliUNIT(s)/Min (1000 mL/Hr) IV Continuous <Continuous>  simethicone 80 milliGRAM(s) Chew every 4 hours      MEDICATIONS  (PRN):  dexAMETHasone  Injectable 4 milliGRAM(s) IV Push every 6 hours PRN Nausea  diphenhydrAMINE 25 milliGRAM(s) Oral every 6 hours PRN Pruritus  diphenhydrAMINE   Injectable 25 milliGRAM(s) IV Push every 4 hours PRN Pruritus  lanolin Ointment 1 Application(s) Topical every 6 hours PRN Sore Nipples  magnesium hydroxide Suspension 30 milliLiter(s) Oral two times a day PRN Constipation  ondansetron Injectable 4 milliGRAM(s) IV Push every 6 hours PRN Nausea  polyethylene glycol 3350 17 Gram(s) Oral every 24 hours PRN Constipation  simethicone 80 milliGRAM(s) Chew every 4 hours PRN Gas      Labs:  Blood type: A Positive  Rubella IgG: RPR: Negative                          9.4<L>   11.21<H> >-----------< 160    ( 07-03 @ 06:55 )             28.5<L>      PE:  General: NAD  Abdomen: Soft, appropriately tender, incision c/d/i.  Extremities: No erythema, no pitting edema

## 2021-07-04 NOTE — PROGRESS NOTE ADULT - PROBLEM SELECTOR PLAN 1
- Continue regular diet.  - Increase ambulation.  - Continue motrin, tylenol, oxycodone PRN for pain control    LISA Soriano, PGY-1

## 2021-07-05 ENCOUNTER — TRANSCRIPTION ENCOUNTER (OUTPATIENT)
Age: 36
End: 2021-07-05

## 2021-07-05 VITALS
HEART RATE: 81 BPM | TEMPERATURE: 98 F | RESPIRATION RATE: 18 BRPM | SYSTOLIC BLOOD PRESSURE: 110 MMHG | DIASTOLIC BLOOD PRESSURE: 68 MMHG | OXYGEN SATURATION: 97 %

## 2021-07-05 PROCEDURE — 59025 FETAL NON-STRESS TEST: CPT

## 2021-07-05 PROCEDURE — 82962 GLUCOSE BLOOD TEST: CPT

## 2021-07-05 PROCEDURE — 86780 TREPONEMA PALLIDUM: CPT

## 2021-07-05 PROCEDURE — 85025 COMPLETE CBC W/AUTO DIFF WBC: CPT

## 2021-07-05 PROCEDURE — 86850 RBC ANTIBODY SCREEN: CPT

## 2021-07-05 PROCEDURE — C1765: CPT

## 2021-07-05 PROCEDURE — 86769 SARS-COV-2 COVID-19 ANTIBODY: CPT

## 2021-07-05 PROCEDURE — 86900 BLOOD TYPING SEROLOGIC ABO: CPT

## 2021-07-05 PROCEDURE — 86901 BLOOD TYPING SEROLOGIC RH(D): CPT

## 2021-07-05 PROCEDURE — 59050 FETAL MONITOR W/REPORT: CPT

## 2021-07-05 RX ORDER — INSULIN DETEMIR 100/ML (3)
66 INSULIN PEN (ML) SUBCUTANEOUS
Qty: 0 | Refills: 0 | DISCHARGE

## 2021-07-05 RX ORDER — ACETAMINOPHEN 500 MG
3 TABLET ORAL
Qty: 0 | Refills: 0 | DISCHARGE
Start: 2021-07-05

## 2021-07-05 RX ORDER — BNT162B2 0.23 MG/2.25ML
0.3 INJECTION, SUSPENSION INTRAMUSCULAR ONCE
Refills: 0 | Status: DISCONTINUED | OUTPATIENT
Start: 2021-07-05 | End: 2021-07-05

## 2021-07-05 RX ORDER — EPINEPHRINE 0.3 MG/.3ML
0.3 INJECTION INTRAMUSCULAR; SUBCUTANEOUS ONCE
Refills: 0 | Status: DISCONTINUED | OUTPATIENT
Start: 2021-07-05 | End: 2021-07-05

## 2021-07-05 RX ORDER — CX-024414 0.2 MG/ML
0.5 INJECTION, SUSPENSION INTRAMUSCULAR ONCE
Refills: 0 | Status: DISCONTINUED | OUTPATIENT
Start: 2021-07-05 | End: 2021-07-05

## 2021-07-05 RX ORDER — ASCORBIC ACID 60 MG
1 TABLET,CHEWABLE ORAL
Qty: 0 | Refills: 0 | DISCHARGE
Start: 2021-07-05

## 2021-07-05 RX ORDER — FERROUS SULFATE 325(65) MG
1 TABLET ORAL
Qty: 0 | Refills: 0 | DISCHARGE
Start: 2021-07-05

## 2021-07-05 RX ORDER — IBUPROFEN 200 MG
1 TABLET ORAL
Qty: 0 | Refills: 0 | DISCHARGE
Start: 2021-07-05

## 2021-07-05 RX ORDER — SIMETHICONE 80 MG/1
1 TABLET, CHEWABLE ORAL
Qty: 0 | Refills: 0 | DISCHARGE
Start: 2021-07-05

## 2021-07-05 RX ORDER — RIBOFLAVIN (VITAMIN B2) 25 MG
1 TABLET ORAL
Qty: 0 | Refills: 0 | DISCHARGE

## 2021-07-05 RX ADMIN — Medication 975 MILLIGRAM(S): at 10:03

## 2021-07-05 RX ADMIN — Medication 600 MILLIGRAM(S): at 00:59

## 2021-07-05 RX ADMIN — Medication 600 MILLIGRAM(S): at 12:48

## 2021-07-05 RX ADMIN — Medication 325 MILLIGRAM(S): at 05:24

## 2021-07-05 RX ADMIN — SIMETHICONE 80 MILLIGRAM(S): 80 TABLET, CHEWABLE ORAL at 05:24

## 2021-07-05 RX ADMIN — Medication 500 MILLIGRAM(S): at 05:24

## 2021-07-05 RX ADMIN — Medication 975 MILLIGRAM(S): at 02:36

## 2021-07-05 RX ADMIN — Medication 975 MILLIGRAM(S): at 11:00

## 2021-07-05 RX ADMIN — Medication 975 MILLIGRAM(S): at 03:43

## 2021-07-05 RX ADMIN — SIMETHICONE 80 MILLIGRAM(S): 80 TABLET, CHEWABLE ORAL at 02:36

## 2021-07-05 RX ADMIN — Medication 600 MILLIGRAM(S): at 05:23

## 2021-07-05 RX ADMIN — HEPARIN SODIUM 5000 UNIT(S): 5000 INJECTION INTRAVENOUS; SUBCUTANEOUS at 02:37

## 2021-07-05 NOTE — DISCHARGE NOTE OB - PATIENT PORTAL LINK FT
You can access the FollowMyHealth Patient Portal offered by [facility] by registering at the following website: [email or web address]. By joining [facility]’s FollowMyHealth portal, you will also be able to view your health information using other applications (apps) compatible with our system.

## 2021-07-05 NOTE — DISCHARGE NOTE OB - REST! DO NOT DO HEAVY HOUSEWORK, LIFTING OR STRENOUS EXERCISE FOR TWO WEEKS
Dr Colorado will communicated with her pulmonary physician regarding status of her asthma control prior to ordering the Metoprolol.   Statement Selected

## 2021-07-05 NOTE — DISCHARGE NOTE OB - MEDICATION SUMMARY - MEDICATIONS TO STOP TAKING
I will STOP taking the medications listed below when I get home from the hospital:    Levemir 100 units/mL subcutaneous solution  -- 66 unit(s) subcutaneous once a day (at bedtime)

## 2021-07-05 NOTE — PROGRESS NOTE ADULT - SUBJECTIVE AND OBJECTIVE BOX
OB Postpartum Note:  Delivery, POD#3    S: 34yo POD#3 s/p LTCS. Her pain is well controlled. She is tolerating a regular diet and passing flatus. Voiding spontaneously. Denies N/V. Denies CP/SOB/lightheadedness/dizziness.     O:   Vitals:  Vital Signs Last 24 Hrs  T(C): 36.7 (2021 21:00), Max: 36.9 (2021 13:04)  T(F): 98 (2021 21:00), Max: 98.4 (2021 13:04)  HR: 80 (2021 21:00) (74 - 80)  BP: 121/74 (2021 21:00) (106/67 - 121/74)  BP(mean): --  RR: 18 (2021 21:00) (18 - 18)  SpO2: 97% (2021 21:00) (96% - 98%)    MEDICATIONS  (STANDING):  acetaminophen   Tablet .. 975 milliGRAM(s) Oral <User Schedule>  ascorbic acid 500 milliGRAM(s) Oral two times a day  chlorhexidine 2% Cloths 1 Application(s) Topical once  diphtheria/tetanus/pertussis (acellular) Vaccine (ADAcel) 0.5 milliLiter(s) IntraMuscular once  ferrous    sulfate 325 milliGRAM(s) Oral two times a day  heparin   Injectable 5000 Unit(s) SubCutaneous every 12 hours  ibuprofen  Tablet. 600 milliGRAM(s) Oral every 6 hours  lactated ringers. 1000 milliLiter(s) (125 mL/Hr) IV Continuous <Continuous>  oxytocin Infusion 333.333 milliUNIT(s)/Min (1000 mL/Hr) IV Continuous <Continuous>  simethicone 80 milliGRAM(s) Chew every 4 hours    MEDICATIONS  (PRN):  dexAMETHasone  Injectable 4 milliGRAM(s) IV Push every 6 hours PRN Nausea  diphenhydrAMINE 25 milliGRAM(s) Oral every 6 hours PRN Pruritus  diphenhydrAMINE   Injectable 25 milliGRAM(s) IV Push every 4 hours PRN Pruritus  lanolin Ointment 1 Application(s) Topical every 6 hours PRN Sore Nipples  magnesium hydroxide Suspension 30 milliLiter(s) Oral two times a day PRN Constipation  ondansetron Injectable 4 milliGRAM(s) IV Push every 6 hours PRN Nausea  polyethylene glycol 3350 17 Gram(s) Oral every 24 hours PRN Constipation  simethicone 80 milliGRAM(s) Chew every 4 hours PRN Gas      Labs:  Blood type: A Positive  Rubella IgG: RPR: Negative                          9.4<L>   11.21<H> >-----------< 160    (  @ 06:55 )             28.5<L>    PE:  General: NAD  Abdomen: mildly distended,appropriately tender, incision c/d/i.  Extremities: SCDs in place, no erythema

## 2021-07-05 NOTE — PROGRESS NOTE ADULT - ASSESSMENT
A/P: 36yo POD#3 s/p LTCS. Patient doing well post-operatively.   - Continue regular diet.  - Increase ambulation.  - Continue motrin, tylenol, oxycodone PRN for pain control.  - Discharge planning.    Isidra Bloom MD  PGY-1

## 2021-07-05 NOTE — DISCHARGE NOTE OB - CARE PROVIDER_API CALL
Rishi San)  Obstetrics and Gynecology  [address] [place] [address], [address]  [place] [address]  Phone: [phone]  Fax: [phone]  Follow Up Time:

## 2021-07-05 NOTE — DISCHARGE NOTE OB - CARE PLAN
Principal Discharge DX:	 delivery delivered  Goal:	postoperative care  Assessment and plan of treatment:	Follow up in the office in 2 weeks. Call to schedule an appointment.  Secondary Diagnosis:	Anemia due to acute blood loss  Goal:	normal blood count  Assessment and plan of treatment:	Continue iron and vitamin C.

## 2021-07-05 NOTE — DISCHARGE NOTE OB - NS OB DC IMMUNIZATIONS MMR YN
Janiya Barnes Patient Age: 56 year old  MESSAGE:   Patient calling back to give doctor update of how medication is working for patient.     • escitalopram (LEXAPRO) 5 MG tablet     Patient states that Lexapro is not helping patient with sleep, and would natalie to be advised if okay to get medication filled for Ambien    • zolpidem (AMBIEN) 5 MG tablet     Routed to provider's clinical pool.   Message confirmed with caller.       Next and Last Visit with Provider and Department  Last visit with this provider: 2/4/2019  Last visit with this department: Visit date not found    Next visit with this provider: Visit date not found  Next visit with this department: Visit date not found    WEIGHT AND HEIGHT:   Wt Readings from Last 1 Encounters:   01/29/19 68.6 kg (151 lb 4 oz)     Ht Readings from Last 1 Encounters:   11/10/15 5' 2\" (1.575 m)     BMI Readings from Last 1 Encounters:   01/29/19 27.66 kg/m²       ALLERGIES: no known allergies.  Current Outpatient Medications   Medication   • zolpidem (AMBIEN) 5 MG tablet   • zolpidem (AMBIEN) 5 MG tablet   • escitalopram (LEXAPRO) 5 MG tablet   • Multiple Vitamins-Minerals (MULTIVITAMIN PO)     No current facility-administered medications for this visit.      PHARMACY to use: n/a          Pharmacy preference(s) on file:   [facility] - [place] [address]  Phone: [phone] Fax: [phone]    CALL BACK INFO: Ok to leave response (including medical information) on answering machine  ROUTING: Patient's physician/staff        PCP: Deion Moscoso MD         INS: Payor: BCBS OUT OF STATE ( SEND TO LOCAL) / Plan: BCBS OUT OF STATE (SEND TO LOCAL) / Product Type: O MISC   PATIENT ADDRESS:  [address] [place], [place] [id number]  
Can refill ambien 30 tabs no refills  
Patient last saw Dr. Huitron 1/29/19 for annual.   Sent to Dr. Huitron.    Left message for patient that Dr. Huitron will be back in office next week.       
Per Dr Huitron pt does not need to wean off Lexapro as her dose was only 5mg  Pt to take the Trazodone at night  Can take Ambien if needed but will increase effects of drowsiness  Pt will need to build up therapeutic effect of Trazodone    lmtco on vm   
Pt returned call and instructions given  rx for Trazodone 50mg nightly to [facility]  Pt can increase to 100mg nightly after 2 weeks if needed  Pt instructed while adjusting to Trazodone she may take Ambien if needed but not as permanent option  Pt will check with pharmacist to verify they can be taken together  No further questions  
Pt returning call    PSR relay information    Pt has more question     Call connected to [facility] OB Triage Queue.  Routed to provider's clinical pool.  
Routed to Dr Huitron  Does pt need to wean off the Lexapro?  She is taking the Ambien at night to sleep with the Lexapro  Should pt wean off the Lexapro with a morning dose and take the trazodone at night to build up effect?  Im concerned she will be taking all 3 at night   
Routed to Dr Huitron  Trazodone comes in 25,50,100,150,300mg dose  Or 150 or 300mg ER   Please confirm dose   
Sent to Dr. Huitron.  Ambien Rx refilled #30, refill 0.     Spoke with patient to notify her that Ambien was refilled.   Patient states original message taken by PSR was incorrect.   Patient states she prefers to wean off Ambien, but the Lexapro is not helping her at all. Patient states she was told by Dr. Huitron to try Lexapro for 6 weeks, and then let Dr. Huitron know how she was doing on it.   She states Lexapro does not help her to sleep at all, and she would rather not take Ambien (but is thankful for the refill in case she does need it).   Patient is asking what her options are.  
Trazadone 50mg po qhs may increase to 100mg po after 2 weeks  
We can try another SSRI trazodone to see if helps more for sleep   
No

## 2021-07-05 NOTE — DISCHARGE NOTE OB - MEDICATION SUMMARY - MEDICATIONS TO TAKE
I will START or STAY ON the medications listed below when I get home from the hospital:    acetaminophen 325 mg oral tablet  -- 3 tab(s) by mouth   -- Indication: For pain    ibuprofen 600 mg oral tablet  -- 1 tab(s) by mouth every 6 hours  -- Indication: For pain    ZyrTEC 10 mg oral tablet  -- 1 tab(s) by mouth once a day  -- Indication: For allergies    Pepcid 20 mg oral tablet  -- orally once a day, As Needed  -- Indication: For reflux    Prenatal 1  -- orally once a day  -- Indication: For nutritional supplement    ferrous sulfate 325 mg (65 mg elemental iron) oral tablet  -- 1 tab(s) by mouth 2 times a day  -- Indication: For anemia    simethicone 80 mg oral tablet, chewable  -- 1 tab(s) by mouth every 4 hours, As needed, Gas  -- Indication: For gas pain    ascorbic acid 500 mg oral tablet  -- 1 tab(s) by mouth 2 times a day  -- Indication: For anemia

## 2021-07-05 NOTE — DISCHARGE NOTE OB - PLAN OF CARE
postoperative care Follow up in the office in 2 weeks. Call to schedule an appointment. Continue iron and vitamin C. normal blood count

## 2021-07-05 NOTE — DISCHARGE NOTE OB - HOSPITAL COURSE
PT admitted for repeat  section at 39 weeks. Pt had uncomplicated  delivery and uncomplicated postoperative course. On postoperative day 3 the patient was discharged home to follow up in 2 weeks.

## 2021-07-05 NOTE — PROGRESS NOTE ADULT - ATTENDING COMMENTS
Patient seen and examined. Agree with above.
pt seen and examined. doing well. discussed adjunctive pain management with ice/heat packs. will add toradol for the day and transition to motrin tomorrow.    RADHA Norris

## 2021-07-14 ENCOUNTER — FORM ENCOUNTER (OUTPATIENT)
Age: 36
End: 2021-07-14

## 2021-07-14 ENCOUNTER — APPOINTMENT (OUTPATIENT)
Dept: OBGYN | Facility: CLINIC | Age: 36
End: 2021-07-14
Payer: COMMERCIAL

## 2021-07-14 PROCEDURE — 0503F POSTPARTUM CARE VISIT: CPT

## 2021-07-20 ENCOUNTER — APPOINTMENT (OUTPATIENT)
Dept: OBGYN | Facility: CLINIC | Age: 36
End: 2021-07-20
Payer: COMMERCIAL

## 2021-07-20 ENCOUNTER — FORM ENCOUNTER (OUTPATIENT)
Age: 36
End: 2021-07-20

## 2021-07-20 PROCEDURE — 0503F POSTPARTUM CARE VISIT: CPT

## 2021-07-27 ENCOUNTER — APPOINTMENT (OUTPATIENT)
Dept: OBGYN | Facility: CLINIC | Age: 36
End: 2021-07-27

## 2021-08-17 ENCOUNTER — FORM ENCOUNTER (OUTPATIENT)
Age: 36
End: 2021-08-17

## 2021-08-18 ENCOUNTER — APPOINTMENT (OUTPATIENT)
Dept: OBGYN | Facility: CLINIC | Age: 36
End: 2021-08-18
Payer: COMMERCIAL

## 2021-08-18 VITALS
WEIGHT: 202 LBS | HEIGHT: 63 IN | BODY MASS INDEX: 35.79 KG/M2 | SYSTOLIC BLOOD PRESSURE: 100 MMHG | DIASTOLIC BLOOD PRESSURE: 63 MMHG

## 2021-08-18 PROCEDURE — 0503F POSTPARTUM CARE VISIT: CPT

## 2021-09-14 NOTE — OB PROVIDER H&P - NS_OBGYNHISTORY_OBGYN_ALL_OB_FT
OBHx - pLTCS (2017), failed IOL for GDMA2, residual left flank pain since  GYNHx - s/p lsc L ovarian cystectomy, LEEP x2
Continue Regimen: La roche posay duo acne cleanser and moisturizer and benzoyl peroxide.
Initiate Treatment: spironolactone 100 mg tablet QHS, doxycycline hyclate 100 mg capsule QD
Render In Strict Bullet Format?: No
Detail Level: Zone

## 2021-09-23 ENCOUNTER — FORM ENCOUNTER (OUTPATIENT)
Age: 36
End: 2021-09-23

## 2021-09-28 ENCOUNTER — FORM ENCOUNTER (OUTPATIENT)
Age: 36
End: 2021-09-28

## 2021-10-21 DIAGNOSIS — Z86.19 PERSONAL HISTORY OF OTHER INFECTIOUS AND PARASITIC DISEASES: ICD-10-CM

## 2021-10-21 DIAGNOSIS — Z98.891 HISTORY OF UTERINE SCAR FROM PREVIOUS SURGERY: ICD-10-CM

## 2021-10-21 DIAGNOSIS — Z98.890 OTHER SPECIFIED POSTPROCEDURAL STATES: ICD-10-CM

## 2021-10-21 DIAGNOSIS — Z87.42 PERSONAL HISTORY OF OTHER DISEASES OF THE FEMALE GENITAL TRACT: ICD-10-CM

## 2021-10-21 DIAGNOSIS — Z86.32 PERSONAL HISTORY OF GESTATIONAL DIABETES: ICD-10-CM

## 2021-10-21 DIAGNOSIS — Z13.79 ENCOUNTER FOR OTHER SCREENING FOR GENETIC AND CHROMOSOMAL ANOMALIES: ICD-10-CM

## 2021-10-21 LAB — CYTOLOGY CVX/VAG DOC THIN PREP: NORMAL

## 2021-10-25 RX ORDER — MULTIVIT-MIN/IRON/FOLIC ACID/K 18-600-40
CAPSULE ORAL
Refills: 0 | Status: ACTIVE | COMMUNITY

## 2021-10-25 RX ORDER — CETIRIZINE HCL 10 MG
TABLET ORAL
Refills: 0 | Status: ACTIVE | COMMUNITY

## 2021-10-29 ENCOUNTER — APPOINTMENT (OUTPATIENT)
Dept: OBGYN | Facility: CLINIC | Age: 36
End: 2021-10-29
Payer: COMMERCIAL

## 2021-10-29 VITALS
HEART RATE: 90 BPM | SYSTOLIC BLOOD PRESSURE: 109 MMHG | RESPIRATION RATE: 17 BRPM | WEIGHT: 190 LBS | BODY MASS INDEX: 33.66 KG/M2 | HEIGHT: 63 IN | DIASTOLIC BLOOD PRESSURE: 70 MMHG | OXYGEN SATURATION: 98 %

## 2021-10-29 DIAGNOSIS — Z78.9 OTHER SPECIFIED HEALTH STATUS: ICD-10-CM

## 2021-10-29 DIAGNOSIS — Z87.42 PERSONAL HISTORY OF OTHER DISEASES OF THE FEMALE GENITAL TRACT: ICD-10-CM

## 2021-10-29 PROCEDURE — 99213 OFFICE O/P EST LOW 20 MIN: CPT

## 2021-10-29 NOTE — REASON FOR VISIT
[Follow-Up] : a follow-up evaluation of [Pelvic Pain] : pelvic pain [Dyspareunia] : dyspareunia [Vulvar/Vaginal Complaint] : vulvar/vaginal complaint [Other: _____] : [unfilled]

## 2021-11-01 PROBLEM — Z87.42 HISTORY OF DYSPAREUNIA IN FEMALE: Status: RESOLVED | Noted: 2021-10-29 | Resolved: 2021-11-01

## 2021-11-01 RX ORDER — BLOOD-GLUCOSE METER
KIT MISCELLANEOUS 4 TIMES DAILY
Qty: 1 | Refills: 1 | Status: DISCONTINUED | COMMUNITY
Start: 2021-04-14 | End: 2021-11-01

## 2021-11-01 RX ORDER — INSULIN DETEMIR 100 [IU]/ML
100 INJECTION, SOLUTION SUBCUTANEOUS
Qty: 1 | Refills: 1 | Status: DISCONTINUED | COMMUNITY
Start: 2021-05-06 | End: 2021-11-01

## 2021-11-01 RX ORDER — ISOPROPYL ALCOHOL 0.7 ML/ML
SWAB TOPICAL
Qty: 2 | Refills: 2 | Status: DISCONTINUED | COMMUNITY
Start: 2021-04-27 | End: 2021-11-01

## 2021-11-01 RX ORDER — IRON/IRON ASP GLY/FA/MV-MIN 38 125-25-1MG
TABLET ORAL
Refills: 0 | Status: DISCONTINUED | COMMUNITY
End: 2021-11-01

## 2021-11-01 RX ORDER — BLOOD-GLUCOSE METER
W/DEVICE KIT MISCELLANEOUS
Qty: 1 | Refills: 0 | Status: DISCONTINUED | COMMUNITY
Start: 2021-04-14 | End: 2021-11-01

## 2021-11-01 RX ORDER — LANCETS 33 GAUGE
EACH MISCELLANEOUS
Qty: 1 | Refills: 1 | Status: DISCONTINUED | COMMUNITY
Start: 2021-04-14 | End: 2021-11-01

## 2021-11-01 RX ORDER — LORATADINE 5 MG
TABLET,CHEWABLE ORAL
Refills: 0 | Status: DISCONTINUED | COMMUNITY
End: 2021-11-01

## 2021-11-01 RX ORDER — INSULIN HUMAN 100 [IU]/ML
100 INJECTION, SUSPENSION SUBCUTANEOUS
Qty: 1 | Refills: 2 | Status: DISCONTINUED | COMMUNITY
Start: 2021-04-27 | End: 2021-11-01

## 2021-11-01 RX ORDER — URINE ACETONE TEST STRIPS
STRIP MISCELLANEOUS
Qty: 1 | Refills: 1 | Status: DISCONTINUED | COMMUNITY
Start: 2021-04-14 | End: 2021-11-01

## 2021-11-01 RX ORDER — CALCIUM CARB/VITAMIN D3/VIT K1 500-100-40
31G X 5/16" TABLET,CHEWABLE ORAL
Qty: 1 | Refills: 2 | Status: DISCONTINUED | COMMUNITY
Start: 2021-04-27 | End: 2021-11-01

## 2021-11-01 RX ORDER — ELECTROLYTES/DEXTROSE
31G X 6 MM SOLUTION, ORAL ORAL
Qty: 1 | Refills: 3 | Status: DISCONTINUED | COMMUNITY
Start: 2021-04-27 | End: 2021-11-01

## 2021-11-01 RX ORDER — ACETAMINOPHEN 325 MG/1
TABLET, FILM COATED ORAL
Refills: 0 | Status: DISCONTINUED | COMMUNITY
End: 2021-11-01
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
## 2021-11-01 NOTE — HISTORY OF PRESENT ILLNESS
[Localized] : localized [Ball] : intercourse [CT] : CT [Yes] : Patient has concerns regarding sex [Previously active] : previously active [Men] : men [Vaginal] : vaginal [No] : No [Patient refuses STI testing] : Patient refuses STI testing [Y] : Positive pregnancy history [TextBox_102] : no menses since delievery [Menses] : not menses [Urination] : not urination [Bowel Movement] : not bowel movement [TextBox_7] : umbilicus down to hips in pelvis [TextBox_10] : tender [TextBox_13] : constant [TextBox_27] : going for CT 11/8/21 [FreeTextEntry1] : pain

## 2021-11-01 NOTE — PLAN
[FreeTextEntry1] : PPD\par -seeinng therapist\par -started on Cymbalta 50mg-startedlast weeking-doing well, no SI/HI\par -referral to [facility] program\par \par Vaginismus/Dyspareunia\par -referral for pelvic floor PT to [facility] given today

## 2021-11-01 NOTE — PHYSICAL EXAM
[Chaperone Present] : A chaperone was present in the examining room during all aspects of the physical examination [FreeTextEntry1] : l. mendelson rn, np student [No Acute Distress] : no acute distress [No Lymphadenopathy] : no lymphadenopathy [Non-tender] : non-tender [Depressed Mood] : depressed mood [FreeTextEntry6] : na [Labia Majora] : normal [Labia Minora] : normal [Tenderness] : tenderness [No Bleeding] : There was no active vaginal bleeding [Soft] : soft [Normal] : normal [Declined] : Patient declined rectal exam [FreeTextEntry8] : very tense on exam; difficult exam, no pain near introitus-very tense and reports tenderness on exam

## 2021-11-01 NOTE — REVIEW OF SYSTEMS
[Patient Intake Form Reviewed] : Patient intake form was reviewed [Anxiety] : anxiety [Depression] : depression [Negative] : Heme/Lymph [FreeTextEntry2] : depressed mood, PPD [de-identified] : chronic pain [FreeTextEntry1] : pain with intercourse

## 2022-05-25 ENCOUNTER — APPOINTMENT (OUTPATIENT)
Dept: OBGYN | Facility: CLINIC | Age: 37
End: 2022-05-25
Payer: COMMERCIAL

## 2022-05-25 VITALS
WEIGHT: 190 LBS | DIASTOLIC BLOOD PRESSURE: 70 MMHG | BODY MASS INDEX: 33.66 KG/M2 | HEIGHT: 63 IN | SYSTOLIC BLOOD PRESSURE: 125 MMHG

## 2022-05-25 DIAGNOSIS — Z01.411 ENCOUNTER FOR GYNECOLOGICAL EXAMINATION (GENERAL) (ROUTINE) WITH ABNORMAL FINDINGS: ICD-10-CM

## 2022-05-25 PROCEDURE — 99395 PREV VISIT EST AGE 18-39: CPT

## 2022-05-25 PROCEDURE — 99213 OFFICE O/P EST LOW 20 MIN: CPT | Mod: 25

## 2022-05-25 NOTE — END OF VISIT
[FreeTextEntry3] : I, Liliane , acted as a scribe on behalf for Dr. Rishi San on 05/25/2022.\par \par All medical entries made by the scribe were at my, Dr. Rishi San, direction and personally dictated by me on 05/25/2022. I have reviewed the chart and agree that the record accurately reflects my personal performance of the history, physical exam, assessment, and plan. I have personally directed, reviewed, and agreed with the chart.

## 2022-05-25 NOTE — HISTORY OF PRESENT ILLNESS
[FreeTextEntry1] : 37 y/o  presents today for annual exam. She complains of pain in her  scar. She delivered on 2021. Complains of chronic pelvic pain worsen with ovulation. She has pain throughout her menstrual cycle, but it is increased at her period. She also complains of heavy menses since delivery. \par \par OBHx: C/s x2\par GynHx: Hx abnl paps; prior LEEP x2; hx colpo/bx; hx HPV but has cleared; hx genital warts\par PMHx: migraines\par SHx: C/s x2 (, ); left ovarian cystectomy (); knee surgery; foot surgery\par Allergies: narcotics, some local anesthetic; soy; exogenous estrogen\par \par  [PapSmeardate] : 03/20/21 [LMPDate] : 04/27/22

## 2022-05-25 NOTE — PLAN
[FreeTextEntry1] : 37 y/o , annual exam, consider endometriosis \par \par Discussed the possible etiologies of chronic pelvic pain and treatment/diagnostic options at length. Discussed the option of empiric medical therapy (ocp, depoprovera, elmuron, and gnrh agonist options) vs surgical evaluation.\par -Consider endometriosis as etiology \par -rto 5 wks for pelvic sono and Mirena IUD insertion, pt to decide \par \par HCM\par -pap done today\par -vit D/calcium/exercise\par -baseline breast mammo/sono at 37\par -f/u PCP for annual and appropriate immunizations\par -rto 1 year \par

## 2022-06-03 ENCOUNTER — OUTPATIENT (OUTPATIENT)
Dept: OUTPATIENT SERVICES | Facility: HOSPITAL | Age: 37
LOS: 1 days | End: 2022-06-03
Payer: COMMERCIAL

## 2022-06-03 ENCOUNTER — RESULT REVIEW (OUTPATIENT)
Age: 37
End: 2022-06-03

## 2022-06-03 ENCOUNTER — APPOINTMENT (OUTPATIENT)
Dept: ULTRASOUND IMAGING | Facility: IMAGING CENTER | Age: 37
End: 2022-06-03
Payer: COMMERCIAL

## 2022-06-03 ENCOUNTER — APPOINTMENT (OUTPATIENT)
Dept: MAMMOGRAPHY | Facility: IMAGING CENTER | Age: 37
End: 2022-06-03
Payer: COMMERCIAL

## 2022-06-03 DIAGNOSIS — Z98.891 HISTORY OF UTERINE SCAR FROM PREVIOUS SURGERY: Chronic | ICD-10-CM

## 2022-06-03 DIAGNOSIS — Z98.890 OTHER SPECIFIED POSTPROCEDURAL STATES: Chronic | ICD-10-CM

## 2022-06-03 DIAGNOSIS — Z00.8 ENCOUNTER FOR OTHER GENERAL EXAMINATION: ICD-10-CM

## 2022-06-03 PROCEDURE — 77063 BREAST TOMOSYNTHESIS BI: CPT | Mod: 26

## 2022-06-03 PROCEDURE — 76641 ULTRASOUND BREAST COMPLETE: CPT | Mod: 26,50

## 2022-06-03 PROCEDURE — 77063 BREAST TOMOSYNTHESIS BI: CPT

## 2022-06-03 PROCEDURE — 77067 SCR MAMMO BI INCL CAD: CPT | Mod: 26

## 2022-06-03 PROCEDURE — 77067 SCR MAMMO BI INCL CAD: CPT

## 2022-06-03 PROCEDURE — 76641 ULTRASOUND BREAST COMPLETE: CPT

## 2022-06-07 ENCOUNTER — NON-APPOINTMENT (OUTPATIENT)
Age: 37
End: 2022-06-07

## 2022-06-07 LAB
CYTOLOGY CVX/VAG DOC THIN PREP: NORMAL
HPV HIGH+LOW RISK DNA PNL CVX: NOT DETECTED

## 2022-06-08 ENCOUNTER — RESULT REVIEW (OUTPATIENT)
Age: 37
End: 2022-06-08

## 2022-06-08 ENCOUNTER — OUTPATIENT (OUTPATIENT)
Dept: OUTPATIENT SERVICES | Facility: HOSPITAL | Age: 37
LOS: 1 days | End: 2022-06-08
Payer: COMMERCIAL

## 2022-06-08 ENCOUNTER — APPOINTMENT (OUTPATIENT)
Dept: ULTRASOUND IMAGING | Facility: IMAGING CENTER | Age: 37
End: 2022-06-08
Payer: COMMERCIAL

## 2022-06-08 DIAGNOSIS — Z98.890 OTHER SPECIFIED POSTPROCEDURAL STATES: Chronic | ICD-10-CM

## 2022-06-08 DIAGNOSIS — N63.0 UNSPECIFIED LUMP IN UNSPECIFIED BREAST: ICD-10-CM

## 2022-06-08 DIAGNOSIS — Z00.8 ENCOUNTER FOR OTHER GENERAL EXAMINATION: ICD-10-CM

## 2022-06-08 DIAGNOSIS — Z98.891 HISTORY OF UTERINE SCAR FROM PREVIOUS SURGERY: Chronic | ICD-10-CM

## 2022-06-08 PROCEDURE — 76642 ULTRASOUND BREAST LIMITED: CPT

## 2022-06-08 PROCEDURE — 76642 ULTRASOUND BREAST LIMITED: CPT | Mod: 26,LT

## 2022-06-11 ENCOUNTER — APPOINTMENT (OUTPATIENT)
Dept: MAMMOGRAPHY | Facility: CLINIC | Age: 37
End: 2022-06-11

## 2022-06-11 ENCOUNTER — APPOINTMENT (OUTPATIENT)
Dept: ULTRASOUND IMAGING | Facility: CLINIC | Age: 37
End: 2022-06-11

## 2022-06-17 ENCOUNTER — APPOINTMENT (OUTPATIENT)
Dept: ULTRASOUND IMAGING | Facility: CLINIC | Age: 37
End: 2022-06-17

## 2022-06-17 ENCOUNTER — APPOINTMENT (OUTPATIENT)
Dept: MAMMOGRAPHY | Facility: CLINIC | Age: 37
End: 2022-06-17

## 2022-06-29 ENCOUNTER — APPOINTMENT (OUTPATIENT)
Dept: OBGYN | Facility: CLINIC | Age: 37
End: 2022-06-29

## 2022-06-29 ENCOUNTER — ASOB RESULT (OUTPATIENT)
Age: 37
End: 2022-06-29

## 2022-06-29 VITALS — BODY MASS INDEX: 33.66 KG/M2 | HEIGHT: 63 IN | WEIGHT: 190 LBS

## 2022-06-29 PROCEDURE — 99213 OFFICE O/P EST LOW 20 MIN: CPT

## 2022-06-29 PROCEDURE — 76830 TRANSVAGINAL US NON-OB: CPT

## 2022-06-29 NOTE — HISTORY OF PRESENT ILLNESS
[FreeTextEntry1] : 35 y/o  LMP 22 female presents for follow up regarding chronic pelvic pain. Was seen 24 for annual and c/o pelvic pain near C/S scar since delivery on 2021. She has pain throughout her menstrual cycle, but worsens during menses. She also complains of heavy menses since delivery.  declines IUD today\par \par Sono today: Uterus 8.2 x 5.4 x 4.5cm, endometrial lining 19.3mm, No significant C/S scar, corpus luteum on right ovary, small amount of free fluid, retroflexed uterus\par \par OBHx: C/s x2\par GynHx: Hx abnl paps; prior LEEP x2; hx colpo/bx; hx HPV but has cleared; hx genital warts\par PMHx: migraines\par SHx: C/s x2 (, ); left ovarian cystectomy (); knee surgery; foot surgery\par Allergies: narcotics, some local anesthetic; soy; exogenous estrogen [PapSmeardate] : 5/2022 [LMPDate] : 5/28/2022

## 2022-06-29 NOTE — END OF VISIT
[FreeTextEntry3] : I, Anitha Jeri, acted as a scribe on behalf of Dr. Rishi San on 06/29/2022.\par \par All medical entries made by the scribe were at my, Dr. Rishi San, direction and personally dictated by me on 06/29/2022. I have reviewed the chart and agree that the record accurately reflects my personal performance of the history, physical exam, assessment and plan. I have also personally directed, reviewed, and agreed with the chart.  [Time Spent: ___ minutes] : I have spent [unfilled] minutes of time on the encounter.

## 2022-06-29 NOTE — PLAN
[FreeTextEntry1] : 37 y/o  female with chronic pelvic pain.\par \par -declines Mirena IUD at this time\par -start micronor\par -progesterone only as pt is sensitive to estrogen\par -discussed role of diagnostic laparoscopy if progesterone not effective\par -discussed conception soon\par -pt wishes to conceive so no role of hysterectomy\par -RTO 12 weeks for repeat pelvic sono to r/o endometriosis vs adenomyosis

## 2022-07-08 ENCOUNTER — APPOINTMENT (OUTPATIENT)
Dept: MRI IMAGING | Facility: CLINIC | Age: 37
End: 2022-07-08

## 2022-07-08 PROCEDURE — A9585: CPT

## 2022-07-08 PROCEDURE — 72197 MRI PELVIS W/O & W/DYE: CPT

## 2022-07-16 NOTE — OB RN PATIENT PROFILE - PATIENT REPRESENTATIVE NAME
OFFICE VISIT      Patient: Esthela Martin   : 1966 MRN: [id number]    SUBJECTIVE:  Chief Complaint   Patient presents with   • Office Visit   • Annual Exam     fasting       A 56 year old female is here for an annual physical.    Patient has given consent to record this visit for documentation in their clinical record.     HISTORY OF PRESENT ILLNESS:  Health maintenance:   Labs: Due for fasting lab work. Is fasting for labs. Last labs done were in 2022.  Medications: On medications.  Colon cancer screening: Is recommended colonoscopy every 5 years. Grandmother had colon cancer.  Ob/Gyn: Underwent hysterectomy long time ago.  Depression screening: is not depressed. However, she gets anxiety episodes due to the pain.  Dermatology screening: Has a couple of moles. Will inform the dermatologist about it.  Social history: She does not smoke, chew tobacco or vape.    Aortic calcification / Atherosclerosis of abdominal aorta: Her previous doctor has recommended getting the cardiac calcium scoring done. She had a history of chest pain in Match 2022 and underwent work up which revealed very minor calcification of the aorta, and minor atherosclerosis. Is recommended being on Atorvastatin, which she is not very certain about currently. States she is off statins for 2 months. Last lab done in 2022 revealed LDL was 44, and previous labs revealed LDL at 78 in , and 79 in . She followed up with referred Cardiology; however, she was not very satisfied with her visit. Her EKG done is normal. Has not had a stress test in the past.      Gastroesophageal reflux disease, unspecified whether esophagitis present / Bitter taste: She had problems with her stomach. Takes Omeprazole, with fewer benefits. Tried it for 2 weeks. She avoids fried food, and eats healthy. She complains of bitter or bad taste in the mouth. She is undergoing denture procedure. Inquired if she is gluten intolerant. Bowel movements are not  bedtime.     No current facility-administered medications for this visit.     ALLERGIES:  ALLERGIES:   Allergen Reactions   • Latex RASH   • Latex   (Environmental) Other (See Comments)     PAST SURGICAL HISTORY:  Past Surgical History:   Procedure Laterality Date   • Hysterectomy       FAMILY HISTORY:  Family History   Problem Relation Age of Onset   • Hypertension Mother    • Crohn's Disease Sister    • Cancer, Stomach Maternal Grandmother    • Colon Polyps Father    • Cancer, Colon Paternal Grandfather    • Cancer, Rectal Neg Hx      SOCIAL HISTORY:  Social History     Tobacco Use   Smoking Status Never Smoker   Smokeless Tobacco Never Used     Social History     Substance and Sexual Activity   Alcohol Use Not Currently       Review of Systems    Constitutional: Negative for fever, chills, weight changes, and fatigue.  Eye: Negative for eye issues.  ENT: Negative for ear, nose, and throat issues.  Cardiovascular: Negative for chest pain, palpitations and leg swelling.  Respiratory: Negative for shortness of breath, cough and wheezing.  Gastrointestinal: Per HPI.  Genitourinary: Negative for urinary issue.   Neurological: Negative for lightheadedness, dizziness, headache, numbness, tingling, and weakness.  Hematological: Negative for bleeding and bruising.  Musculoskeletal: Negative for joint pain, muscle pain.  Psychiatric: Per HPI. Normal sleep.  Skin: Per HPI.    OBJECTIVE:  Vitals:    07/15/22 1622   BP: 136/87   BP Location: RUE - Right upper extremity   Patient Position: Sitting   Cuff Size: Regular   Pulse: (!) 51   Resp: 16   Temp: 98.3 °F (36.8 °C)   TempSrc: Oral   SpO2: 100%   Weight: 73.9 kg (163 lb)   Height: 5' 7\"       Physical Exam  PE summary: Repeat blood pressure in the office was 136/87 mmHg and the heart rate was 51 beats/minute.   Constitutional: Alert, in no acute distress.   Eyes: No discharge, normal conjunctiva, no eyelid swelling, no ptosis and the sclerae were normal. Pupils equal,  cancer screening:   Up-to-date with colonoscopy; recommended colonoscopy 5 years after the last colonoscopy.   Depression screening:   Notify me if she has any concerns and needs assistance.  Dermatology screening:   Educated her that the moles do not look suspicious.  Referred to Dermatology.   Past medical, surgical, family, and social history reviewed and reconciled.      Aortic calcification / Atherosclerosis of abdominal aorta:   Reviewed and discussed previous EKG and lab work reports.  Ordered CT heart calcium scoring.  Ordered lipid panel with reflex today.  Recommended medication management if LDL is above 70.   Recommended can be off cholesterol medication based on calcium scoring.     Gastroesophageal reflux disease, unspecified whether esophagitis present / Bitter taste:   GERD uncontrolled.   Continue current medication management.  Discussed causes of bad taste in the mouth including chronic reflux issues, or medications.   Discussed ways of identifying intolerance to gluten; likely she is gluten intolerant.  Discussed blood tests are inconclusive for gluten intolerance to be diagnosed.  Informed her that there may be a possibility of Lyrica causing bitter taste in the mouth.  Recommended following up with the dentist for further evaluation of bitter taste.    Epigastric pain:   Reviewed and discussed imaging reports.  Ordered Apolipoprotein B, and comprehensive metabolic panel today.  Can try Voltaren gel as topical application for the pain.  Discussed action of Lyrica on burning pain if it is nerve related, and she probably needs a higher dose.  Discussed the need to follow up with Neurology for evaluation of nerve related concerns given she has burning pain.  Discussed the limitations of tests to figure out neurologic issues in the abdomen.  Referred to Gastroenterology.    Hair loss:   Plan to evaluate causes for hair loss.  Ordered CBC with differential, thyroid stimulating hormone, and vitamin B  Guanaco Deshapnde

## 2022-07-20 ENCOUNTER — TRANSCRIPTION ENCOUNTER (OUTPATIENT)
Age: 37
End: 2022-07-20

## 2022-08-10 ENCOUNTER — NON-APPOINTMENT (OUTPATIENT)
Age: 37
End: 2022-08-10

## 2022-09-30 ENCOUNTER — APPOINTMENT (OUTPATIENT)
Dept: OBGYN | Facility: CLINIC | Age: 37
End: 2022-09-30

## 2022-09-30 VITALS
WEIGHT: 195 LBS | BODY MASS INDEX: 34.55 KG/M2 | DIASTOLIC BLOOD PRESSURE: 76 MMHG | HEIGHT: 63 IN | SYSTOLIC BLOOD PRESSURE: 130 MMHG

## 2022-09-30 PROCEDURE — 99213 OFFICE O/P EST LOW 20 MIN: CPT

## 2022-09-30 NOTE — PLAN
[FreeTextEntry1] : 38 y/o  LMP 22 female, chronic pelvic pain and abnormal bleeding.\par \par Abnormal bleeding/pelvic pain/chronic pain\par -discussed pelvic pain and long standing/chronic left upper abdominal pain could be different etiologies\par -schedule Mirena IUD insertion \par -referral to pelvic pt \par -referral to chronic pain specialist-referrla sent\par - chronic pain to r/o endometriosis, adenomyosis\par \par Giancarlo Care maintenance: \par -rx mammo sono (secondary to birads 3)\par \par \par \par \par

## 2022-09-30 NOTE — HISTORY OF PRESENT ILLNESS
[Patient reported PAP Smear was normal] : Patient reported PAP Smear was normal [FreeTextEntry1] : 36 y/o  LMP 22 female presents for pain in left upper abdomen/flank and chronic pelvic pain. Pt on oral progesterone Micronor mini pill, secondary to not tolerating estrogen, with minimal improvement in pain. Pt with improvement in terms of spotting and ovulation discomfort. Pt s/p pelvic MRI 22. \par \par GynHx: Hx abnl paps; prior LEEP x2; hx colpo/bx; hx HPV but has cleared; hx genital warts,chronic pelvic pain with ovulation, painful and heavy menses\par ObHx: c/s x2 ()\par PmHx: migraines\par PsHx: C/s x2 (, ); left ovarian cystectomy (); knee surgery; foot surgery\par Allergies: narcotics, some local anesthetic; soy; exogenous estrogen \par Current meds:minipill \par allergies: estrogens, lidocaine HCL soln, soy [TextBox_4] : Follow up from birth control [PapSmeardate] : 5/25/22 [LMPDate] : 9/1/22

## 2022-09-30 NOTE — END OF VISIT
[FreeTextEntry3] : I, Jory Sanger acted as a scribe on behalf of Dr. Rishi San on 09/30/2022 .\par \par All medical entries made by the scribe were at my, Dr. Rishi San, direction and personally dictated by me on 09/30/2022. I have reviewed the chart and agree that the record accurately reflects my personal performance of the history, physical exam, assessment and plan. I have also personally directed, reviewed, and agreed with the chart.\par  [Time Spent: ___ minutes] : I have spent [unfilled] minutes of time on the encounter.

## 2022-10-03 ENCOUNTER — NON-APPOINTMENT (OUTPATIENT)
Age: 37
End: 2022-10-03

## 2022-10-03 ENCOUNTER — APPOINTMENT (OUTPATIENT)
Dept: PAIN MANAGEMENT | Facility: CLINIC | Age: 37
End: 2022-10-03

## 2022-10-04 ENCOUNTER — ASOB RESULT (OUTPATIENT)
Age: 37
End: 2022-10-04

## 2022-10-04 ENCOUNTER — APPOINTMENT (OUTPATIENT)
Dept: OBGYN | Facility: CLINIC | Age: 37
End: 2022-10-04

## 2022-10-04 VITALS
DIASTOLIC BLOOD PRESSURE: 80 MMHG | HEIGHT: 63 IN | SYSTOLIC BLOOD PRESSURE: 120 MMHG | WEIGHT: 195 LBS | BODY MASS INDEX: 34.55 KG/M2

## 2022-10-04 PROCEDURE — 58300 INSERT INTRAUTERINE DEVICE: CPT

## 2022-10-04 PROCEDURE — 76998 US GUIDE INTRAOP: CPT

## 2022-10-04 PROCEDURE — 81025 URINE PREGNANCY TEST: CPT

## 2022-10-04 NOTE — ASSESSMENT
[FreeTextEntry1] : 38 yo , presents for Mirena IUD insertion. Pt has been c/o chronic pelvic pain, pelvic MRI normal.\par \par OBHx: C/s x2\par GynHx: Hx abnl paps; prior LEEP x2; hx colpo/bx; hx HPV but has cleared; hx genital warts\par PMHx: migraines\par SHx: C/s x2 (, ); left ovarian cystectomy (); knee surgery; foot surgery\par Allergies: narcotics, some local anesthetic; soy; exogenous estrogen \par \par Pelvic MRI 22:\par \par FINDINGS:\par UTERUS: Measures 10.5 x 4.2 x 5.6 cm.  changes identified.\par ENDOMETRIUM: 6 mm\par JUNCTIONAL ZONE: No radiographic evidence of adenomyosis\par \par RIGHT OVARY: Measures 2.4 x 2.0 x 3.6 cm in size with small follicles the largest measuring 1.2 cm\par LEFT OVARY: Measures 1.6 x 2.3 x 3.3 cm in size with small follicles\par ADNEXA: Unremarkable without evidence of hemorrhagic features or hydrosalpinx\par \par BLADDER: Within normal limits.\par \par LYMPH NODES: No pelvic lymphadenopathy.\par \par VISUALIZED PORTIONS:\par \par ABDOMINAL ORGANS: Within normal limits.\par BOWEL: Within normal limits.\par PERITONEUM: No ascites.\par VESSELS: Within normal limits.\par ABDOMINAL WALL: Within normal limits.\par BONES: Within normal limits.\par \par IMPRESSION:\par Unremarkable pelvic MRI\par

## 2022-10-04 NOTE — PLAN
[FreeTextEntry1] : 38 yo , Mirena IUD insertion.\par \par -IUD inserted without incident\par -f/u with physical therapy and pain management\par -pt to discontinue oral progesterone today\par -rto in 8 weeks for IUD string check.

## 2022-10-04 NOTE — PROCEDURE
[IUD Placement] : intrauterine device (IUD) placement [Time out performed] : Pre-procedure time out performed.  Patient's name, date of birth and procedure confirmed. [Consent Obtained] : Consent obtained [Risks] : risks [Benefits] : benefits [Alternatives] : alternatives [Patient] : patient [Infection] : infection [Bleeding] : bleeding [Pain] : pain [Expulsion] : expulsion [Failure] : failure [Uterine Perforation] : uterine perforation [Neg Pregnancy Test] : negative pregnancy test [Betadine] : Betadine [Mirena IUD] : Mirena IUD [Tolerated Well] : Patient tolerated the procedure well [No Complications] : No complications [Easy Passage] : Easy passage [Post Placement Transvag. US] : post placement transvaginal ultrasound [de-identified] : Ivan clamp [de-identified] : [id number] [de-identified] : OCT 2024

## 2022-10-04 NOTE — PROCEDURE
[IUD Placement] : intrauterine device (IUD) placement [Time out performed] : Pre-procedure time out performed.  Patient's name, date of birth and procedure confirmed. [Consent Obtained] : Consent obtained [Risks] : risks [Benefits] : benefits [Alternatives] : alternatives [Patient] : patient [Infection] : infection [Bleeding] : bleeding [Pain] : pain [Expulsion] : expulsion [Failure] : failure [Uterine Perforation] : uterine perforation [Neg Pregnancy Test] : negative pregnancy test [Betadine] : Betadine [Mirena IUD] : Mirena IUD [Tolerated Well] : Patient tolerated the procedure well [No Complications] : No complications [Easy Passage] : Easy passage [Post Placement Transvag. US] : post placement transvaginal ultrasound [de-identified] : Ivan clamp [de-identified] : [id number] [de-identified] : OCT 2024

## 2022-10-04 NOTE — END OF VISIT
[FreeTextEntry3] : I, Jory Nichols, acted as a scribe on behalf of Dr. Rishi San on 10/04/2022. \par \par All medical entries made by the scribe where at my, Dr. Rishi San, direction and personally dictated by me on 10/04/2022. I have reviewed the chart and agree that the record accurately reflects my personal performance of the history, physical exam, assessment, and plan. I have also personally directed, reviewed and agreed with the chart.

## 2022-10-07 ENCOUNTER — APPOINTMENT (OUTPATIENT)
Dept: PAIN MANAGEMENT | Facility: CLINIC | Age: 37
End: 2022-10-07

## 2022-10-07 PROCEDURE — 99204 OFFICE O/P NEW MOD 45 MIN: CPT | Mod: 95

## 2022-10-07 NOTE — CONSULT LETTER
[Dear  ___] : Dear  [unfilled], [Consult Letter:] : I had the pleasure of evaluating your patient, [unfilled]. [Please see my note below.] : Please see my note below. [Consult Closing:] : Thank you very much for allowing me to participate in the care of this patient.  If you have any questions, please do not hesitate to contact me. [Sincerely,] : Sincerely, [FreeTextEntry3] : Alvin Cordoba DO

## 2022-10-07 NOTE — HISTORY OF PRESENT ILLNESS
[Home] : at home, [unfilled] , at the time of the visit. [Medical Office: ([facility])___] : at the medical office located in  [Verbal consent obtained from patient] : the patient, [unfilled] [FreeTextEntry1] : HPI - MsJodie PYLE is a 37 year F with PHx as below, referred by Dr San who presents today with chief complaint of left upper abdomen/flank and chronic pelvic pain. Reports that it developed 5 years ago insidiously. It is located right flank, radiating from thorax across the ribs;  there is radiation of the pain into the ribs and lower abdomen. The pain is presently 7/10 in severity on the numerical rating scale. It is sharp and burning in nature. The pain is constant. There is diurnal worsening, during the night The pain is aggravated with movement twisting her torso. The pain improves with rest. The pain is functionally and emotionally disabling for the patient as its preventing them from going about activities of daily living, such as routine housework. The pain does impair the patient’s ability to sleep. \par \par Patient has not been seen by pain management in the past.\par Patient reports failure to respond to 6 weeks of provider directed treatment, including use of Pregabalin\par  \par Reports there is no present numbness, there is no weakness. Patient denies any bowel/bladder incontinence, no saddle/perineal anesthesia or any other red flag signs or symptoms. Reports regular BMs.\par  \par GynHx: Hx abnl paps; prior LEEP x2; hx colpo/bx; hx HPV but has cleared; hx genital warts,chronic pelvic pain with ovulation, painful and heavy menses\par ObHx: c/s x2 (2021)\par PmHx: migraines\par PsHx: C/s x2 (2017, 2021); left ovarian cystectomy (2012); knee surgery; foot surgery

## 2022-10-07 NOTE — ASSESSMENT
[FreeTextEntry1] : Ms. 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 \par  \par Gabapentin 100 mg po qhs titrate to 3 tabs as tolerated\par  \par >> Interventions\par  \par None indicated at this time\par  \par >> Therapy and Other Modalities\par  \par Continue with daily home stretching regimen\par \par >> Imaging and Other Studies\par \par New MRI thoracic spine w/ w/o contrast eval for impingement at t5/6. Looking at MRI 2019 myself, I have some concern for mass or cyst of some kind in posterior canal, considering spinal cord is completely anterior in the canal \par \par >> Consults\par \par F/u OBGYN re endometriosis

## 2022-10-07 NOTE — DATA REVIEWED
[FreeTextEntry1] : [place] PD[place] Reference #: [id number]\par \par Others' Prescriptions\par Patient Name: Ele FrankBirth Date: 1985\par Address: [address] [place] [id number]: Female\par Rx Written	Rx Dispensed	Drug	Quantity	Days Supply	Prescriber Name	Prescriber Kasey #	Payment Method	Dispenser\par 2021	pregabalin 50 mg capsule	14	7	Aidan Hassan MD	[id number]	[facility] [employer] [place]\par 2021	pregabalin 100 mg capsule	60	30	Aidan Hassan MD	[id number]	[facility]\par \par \par EXAM: [id number] - MR PELVIS WAW IC - ORDERED BY: NILSON BARBOAS\par \par \par PROCEDURE DATE: 2022\par \par \par \par INTERPRETATION: CLINICAL INFORMATION: Pelvic painHistory of ovarian cyst removal and C-sections\par \par COMPARISON: None.\par \par CONTRAST/COMPLICATIONS:\par IV Contrast: Gadavist 7.5 cc administered 0 cc discarded\par Oral Contrast: NONE\par Complications: None reported at time of study completion\par \par PROCEDURE:\par MRI of the pelvis was performed.\par Levsin 0.25 mg administered sublingual.\par \par FINDINGS:\par UTERUS: Measures 10.5 x 4.2 x 5.6 cm.  changes identified.\par ENDOMETRIUM: 6 mm\par JUNCTIONAL ZONE: No radiographic evidence of adenomyosis\par \par RIGHT OVARY: Measures 2.4 x 2.0 x 3.6 cm in size with small follicles the largest measuring 1.2 cm\par LEFT OVARY: Measures 1.6 x 2.3 x 3.3 cm in size with small follicles\par ADNEXA: Unremarkable without evidence of hemorrhagic features or hydrosalpinx\par \par BLADDER: Within normal limits.\par \par LYMPH NODES: No pelvic lymphadenopathy.\par \par VISUALIZED PORTIONS:\par \par ABDOMINAL ORGANS: Within normal limits.\par BOWEL: Within normal limits.\par PERITONEUM: No ascites.\par VESSELS: Within normal limits.\par ABDOMINAL WALL: Within normal limits.\par BONES: Within normal limits.\par \par IMPRESSION:\par Unremarkable pelvic MRI

## 2022-10-07 NOTE — PHYSICAL EXAM
[de-identified] : Physical Exam (Telemedicine): \par \par Gen: AAOX3, NAD\par HEENT: PERRLA, EOMI, NCAT, NP\par Pulmonary: No Signs of Respiratory Distress\par MSK: AROM WFL, Limitations painful truncal extension\par Neurological: Grossly neurologically intact, Noted deficits none\par Gait: Normal, Non-antalgic, Sans AD\par Derm: No Rash, (-)Lesions, (-)Erythema, (-)Cyanosis \par Psych: Calm, Cooperative, Conversational\par \par Disclaimer: This is a limited examination for the purposes of conducting a telemedicine visit during the COVID-19 pandemic. Physical exam maneuvers were modified as necessary to allow patient to self perform. A focused physician physical examination will be performed during in person visits and should be referred to to determine need for further testing, interventions or degree of disability.

## 2022-10-07 NOTE — REVIEW OF SYSTEMS
[Radiating Pain] : radiating pain [Decreased ROM] : decreased range of motion [Negative] : Heme/Lymph [Abdominal Pain] : abdominal pain

## 2022-10-08 ENCOUNTER — NON-APPOINTMENT (OUTPATIENT)
Age: 37
End: 2022-10-08

## 2022-10-11 ENCOUNTER — NON-APPOINTMENT (OUTPATIENT)
Age: 37
End: 2022-10-11

## 2022-10-11 ENCOUNTER — OUTPATIENT (OUTPATIENT)
Dept: OUTPATIENT SERVICES | Facility: HOSPITAL | Age: 37
LOS: 1 days | End: 2022-10-11
Payer: COMMERCIAL

## 2022-10-11 ENCOUNTER — APPOINTMENT (OUTPATIENT)
Dept: MRI IMAGING | Facility: CLINIC | Age: 37
End: 2022-10-11

## 2022-10-11 DIAGNOSIS — M54.14 RADICULOPATHY, THORACIC REGION: ICD-10-CM

## 2022-10-11 DIAGNOSIS — Z98.890 OTHER SPECIFIED POSTPROCEDURAL STATES: Chronic | ICD-10-CM

## 2022-10-11 DIAGNOSIS — Z98.891 HISTORY OF UTERINE SCAR FROM PREVIOUS SURGERY: Chronic | ICD-10-CM

## 2022-10-11 PROCEDURE — 72157 MRI CHEST SPINE W/O & W/DYE: CPT

## 2022-10-11 PROCEDURE — A9585: CPT

## 2022-10-11 PROCEDURE — 72157 MRI CHEST SPINE W/O & W/DYE: CPT | Mod: 26

## 2022-10-14 ENCOUNTER — APPOINTMENT (OUTPATIENT)
Dept: PAIN MANAGEMENT | Facility: CLINIC | Age: 37
End: 2022-10-14

## 2022-10-14 VITALS
HEIGHT: 63 IN | DIASTOLIC BLOOD PRESSURE: 72 MMHG | SYSTOLIC BLOOD PRESSURE: 110 MMHG | BODY MASS INDEX: 34.55 KG/M2 | HEART RATE: 75 BPM | WEIGHT: 195 LBS | OXYGEN SATURATION: 98 %

## 2022-10-14 PROCEDURE — 99214 OFFICE O/P EST MOD 30 MIN: CPT

## 2022-10-14 NOTE — REVIEW OF SYSTEMS
[Radiating Pain] : radiating pain [Decreased ROM] : decreased range of motion [Negative] : Heme/Lymph

## 2022-10-14 NOTE — ASSESSMENT
[FreeTextEntry1] : Ms. 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 \par  \par DC Gabapentin\par \par Lyrica 25 mg po qhs x 7 days then bid\par  \par >> Interventions\par  \par None indicated at this time\par  \par >> Therapy and Other Modalities\par  \par PT thoracic 2x weekly / 6 weeks\par \par >> Imaging and Other Studies\par \par I have personally reviewed the images in detail together with the patient today, and I have answered all questions regarding this condition to the best of my ability, to the patient's satisfaction. Spoke with radiologist personally about report.\par \par >> Consults\par \par F/u OBGYN re endometriosis

## 2022-10-14 NOTE — PHYSICAL EXAM
[de-identified] : Gen: AAOX3, NAD\par HEENT: PERRLA, EOMI, NCAT, NP\par Pulmonary: No Signs of Respiratory Distress\par MSK: AROM WFL, Limitations painful truncal extension\par Neurological: Grossly neurologically intact, Noted deficits none\par Gait: Normal, Non-antalgic, Sans AD\par Derm: No Rash, (-)Lesions, (-)Erythema, (-)Cyanosis \par ++ hyperalgesia in right t6 dermatome\par Psych: Calm, Cooperative, Conversational

## 2022-10-14 NOTE — HISTORY OF PRESENT ILLNESS
[8] : a current pain level of 8/10 [FreeTextEntry1] : Interval Note:\par \par Since last visit the pain intensity has persisted\par \par Recent MRI completed\par \par Gabapentin led to nightmares and so DC'ed. \par \par Moving bowels regularly. No recent falls. \par \par Denies weakness, numbness. Patient denies any bowel/bladder incontinence, no saddle/perineal anesthesia or any other red flag signs or symptoms.\par \par \par \par ---\par HPI - Ms. SHELBIE PYLE is a 37 year F with PHx as below, referred by Dr San who presents today with chief complaint of left upper abdomen/flank and chronic pelvic pain. Reports that it developed 5 years ago insidiously. It is located right flank, radiating from thorax across the ribs;  there is radiation of the pain into the ribs and lower abdomen. The pain is presently 7/10 in severity on the numerical rating scale. It is sharp and burning in nature. The pain is constant. There is diurnal worsening, during the night The pain is aggravated with movement twisting her torso. The pain improves with rest. The pain is functionally and emotionally disabling for the patient as its preventing them from going about activities of daily living, such as routine housework. The pain does impair the patient’s ability to sleep. \par \par Patient has not been seen by pain management in the past.\par Patient reports failure to respond to 6 weeks of provider directed treatment, including use of Pregabalin\par  \par Reports there is no present numbness, there is no weakness. Patient denies any bowel/bladder incontinence, no saddle/perineal anesthesia or any other red flag signs or symptoms. Reports regular BMs.\par  \par GynHx: Hx abnl paps; prior LEEP x2; hx colpo/bx; hx HPV but has cleared; hx genital warts,chronic pelvic pain with ovulation, painful and heavy menses\par ObHx: c/s x2 (2021)\par PmHx: migraines\par PsHx: C/s x2 (2017, 2021); left ovarian cystectomy (2012); knee surgery; foot surgery

## 2022-10-14 NOTE — DATA REVIEWED
[FreeTextEntry1] :  MR SPINE THORACIC WAW IC - ORDERED BY: BANG HINKLE\par \par \par PROCEDURE DATE: 10/11/2022\par \par \par \par INTERPRETATION: MR THORACIC SPINE WITHOUT AND WITH IV CONTRAST\par \par Clinical information: eval for impingement at t5/6. cord noted to be anterior on mri 2019 please;\par \par A MRI of the thoracic spine was performed both prior to and after the administration of intravenous contrast.\par \par TECHNIQUE:\par In the sagittal plane T1 pre and postcontrast, T2, and STIR imaging was performed. In the axial plane T1 pre and postcontrast and T2 weighted imaging was utilized.\par Contrast administered 9 cc Gadavist. Contrast discarded 1 cc.\par \par COMPARISON:\par Exam is compared to outside study of 9/6/2019.\par \par FINDINGS:\par SPINAL COLUMN:\par Exam demonstrates normal alignment of the vertebral bodies. There is very mild narrowing of the T10-11 disc space. Vertebral body heights are well-maintained. A hemangioma is present within the T2 vertebral body. No discitis or osteomyelitis. No paraspinal collection\par \par CANAL CONTENTS:\par Thoracic cord is normal in caliber and signal. No evidence of abnormal enhancement.\par There is no intradural extra medullary mass seen.\par Thoracic cord does have an anterior course within the spinal canal from the T3-T7 levels. This is not an unusual finding as it is along the normal thoracic kyphosis and the thoracic spinal cord usually follows the straightest path caudally.\par No focal unusual mass effect/cord deformity is seen.\par Normal CSF pulsation artifact is present on T2-weighted imaging.\par \par DISC LEVELS:\par Minor bulging annuli throughout somewhat greater at T10-11. No significant spinal canal or neural foramina narrowing.\par \par IMPRESSION:\par * THORACIC CORD IS NORMAL IN CALIBER AND SIGNAL AND WITHOUT EVIDENCE OF ABNORMAL ENHANCEMENT.\par * NO INTRADURAL EXTRA MEDULLARY MASS SEEN.\par * THORACIC CORD DOES HAVE AN ANTERIOR COURSE WITHIN THE SPINAL CANAL FROM THE T3-T7 LEVELS. THIS IS NOT AN UNUSUAL FINDING AS IT IS ALONG THE NORMAL THORACIC KYPHOSIS AND THE THORACIC SPINAL CORD USUALLY FOLLOWS THE STRAIGHTEST PATH CAUDALLY.\par \par --- End of Report ---\par \par

## 2022-10-27 ENCOUNTER — NON-APPOINTMENT (OUTPATIENT)
Age: 37
End: 2022-10-27

## 2022-10-27 ENCOUNTER — APPOINTMENT (OUTPATIENT)
Dept: OBGYN | Facility: CLINIC | Age: 37
End: 2022-10-27

## 2022-10-27 ENCOUNTER — ASOB RESULT (OUTPATIENT)
Age: 37
End: 2022-10-27

## 2022-10-27 PROCEDURE — 76830 TRANSVAGINAL US NON-OB: CPT

## 2022-10-27 PROCEDURE — 76856 US EXAM PELVIC COMPLETE: CPT

## 2022-11-18 ENCOUNTER — APPOINTMENT (OUTPATIENT)
Dept: PAIN MANAGEMENT | Facility: CLINIC | Age: 37
End: 2022-11-18

## 2022-11-18 PROCEDURE — 99213 OFFICE O/P EST LOW 20 MIN: CPT | Mod: 95

## 2022-11-18 NOTE — HISTORY OF PRESENT ILLNESS
[Home] : at home, [unfilled] , at the time of the visit. [Medical Office: ([facility])___] : at the medical office located in  [Verbal consent obtained from patient] : the patient, [unfilled] [FreeTextEntry1] : Interval Note:\par \par Since last visit the pain intensity has improved significantly\par \par Began PT and has been engadging in pilates\par \par Moving bowels regularly. No recent falls. \par \par Denies weakness, numbness. Patient denies any bowel/bladder incontinence, no saddle/perineal anesthesia or any other red flag signs or symptoms.\par \par \par \par ---\par HPI - Ms. SHELBIE PYLE is a 37 year F with PHx as below, referred by Dr San who presents today with chief complaint of left upper abdomen/flank and chronic pelvic pain. Reports that it developed 5 years ago insidiously. It is located right flank, radiating from thorax across the ribs;  there is radiation of the pain into the ribs and lower abdomen. The pain is presently 7/10 in severity on the numerical rating scale. It is sharp and burning in nature. The pain is constant. There is diurnal worsening, during the night The pain is aggravated with movement twisting her torso. The pain improves with rest. The pain is functionally and emotionally disabling for the patient as its preventing them from going about activities of daily living, such as routine housework. The pain does impair the patient’s ability to sleep. \par \par Patient has not been seen by pain management in the past.\par Patient reports failure to respond to 6 weeks of provider directed treatment, including use of Pregabalin\par  \par Reports there is no present numbness, there is no weakness. Patient denies any bowel/bladder incontinence, no saddle/perineal anesthesia or any other red flag signs or symptoms. Reports regular BMs.\par  \par GynHx: Hx abnl paps; prior LEEP x2; hx colpo/bx; hx HPV but has cleared; hx genital warts,chronic pelvic pain with ovulation, painful and heavy menses\par ObHx: c/s x2 (2021)\par PmHx: migraines\par PsHx: C/s x2 (2017, 2021); left ovarian cystectomy (2012); knee surgery; foot surgery [Back Pain] : back pain

## 2022-11-18 NOTE — ASSESSMENT
[FreeTextEntry1] : Ms. 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 \par  \par DC Gabapentin\par \par Lyrica 25 mg po qhs x 7 days then bid\par  \par >> Interventions\par  \par None indicated at this time\par  \par >> Therapy and Other Modalities\par  \par CW PT thoracic  \par \par >> Imaging and Other Studies\par \par I have personally reviewed the images in detail together with the patient today, and I have answered all questions regarding this condition to the best of my ability, to the patient's satisfaction. Spoke with radiologist personally about report.\par \par >> Consults\par \par F/u OBGYN re endometriosis

## 2022-11-18 NOTE — PHYSICAL EXAM
[de-identified] : Physical Exam (Telemedicine): \par \par Gen: AAOX3, NAD\par HEENT: PERRLA, EOMI, NCAT, NP\par Pulmonary: No Signs of Respiratory Distress\par MSK: AROM WFL, Limitations painful truncal extension\par Neurological: Grossly neurologically intact, Noted deficits none\par Gait: Normal, Non-antalgic, Sans AD\par Derm: No Rash, (-)Lesions, (-)Erythema, (-)Cyanosis \par Psych: Calm, Cooperative, Conversational\par \par Disclaimer: This is a limited examination for the purposes of conducting a telemedicine visit during the COVID-19 pandemic. Physical exam maneuvers were modified as necessary to allow patient to self perform. A focused physician physical examination will be performed during in person visits and should be referred to to determine need for further testing, interventions or degree of disability.

## 2022-11-18 NOTE — REVIEW OF SYSTEMS
[Muscle Pain] : muscle pain [Radiating Pain] : radiating pain [Decreased ROM] : decreased range of motion [Negative] : Heme/Lymph [Back Pain] : back pain

## 2022-12-08 ENCOUNTER — APPOINTMENT (OUTPATIENT)
Dept: OBGYN | Facility: CLINIC | Age: 37
End: 2022-12-08

## 2022-12-08 VITALS
SYSTOLIC BLOOD PRESSURE: 108 MMHG | WEIGHT: 197 LBS | BODY MASS INDEX: 34.91 KG/M2 | HEIGHT: 63 IN | DIASTOLIC BLOOD PRESSURE: 69 MMHG

## 2022-12-08 DIAGNOSIS — R92.2 INCONCLUSIVE MAMMOGRAM: ICD-10-CM

## 2022-12-08 DIAGNOSIS — Z12.39 ENCOUNTER FOR OTHER SCREENING FOR MALIGNANT NEOPLASM OF BREAST: ICD-10-CM

## 2022-12-08 DIAGNOSIS — N63.0 UNSPECIFIED LUMP IN UNSPECIFIED BREAST: ICD-10-CM

## 2022-12-08 PROCEDURE — 99213 OFFICE O/P EST LOW 20 MIN: CPT | Mod: 25

## 2022-12-08 PROCEDURE — 76857 US EXAM PELVIC LIMITED: CPT

## 2022-12-08 RX ORDER — LEVONORGESTREL 52 MG/1
20 INTRAUTERINE DEVICE INTRAUTERINE
Refills: 0 | Status: ACTIVE | COMMUNITY

## 2022-12-08 NOTE — HISTORY OF PRESENT ILLNESS
[FreeTextEntry1] : 38 y/o presents for iud check after placement on 10/4 by Dr. San. Pelvic pain has significantly improved since having mirena; still having occasional light bleeding but has had no bleeding/spotting or discharge for almost 2 weeks. occasional pain with intercourse but has improved with mirena as well. decreased dose of cymbalta, sees pain specialist. overall doing well no other complaints.

## 2022-12-08 NOTE — PLAN
[FreeTextEntry1] : Mirena IUD\par -doing well and would like to continue\par -string seen today on spec and in place on TVUS\par -reviewed precautions with iud and bleeding profile\par \par Decreased Libido\par -discussed sex therapy\par -can consider medication/supplements if therapy fails\par -discussed could be r/t long hx of dyspareunia\par \par TC risk score >22%\par -erx for breast MRI provided\par -will f/u Dr. San\par \par RTO for annual spring 2023 or sooner if needed\par \par During this visit 20 minutes was spent face-to-face with greater than 50% of the time dedicated to counseling.\par

## 2022-12-08 NOTE — PHYSICAL EXAM
[Chaperone Declined] : Patient declined chaperone [Appropriately responsive] : appropriately responsive [Alert] : alert [No Acute Distress] : no acute distress [Labia Majora] : normal [Labia Minora] : normal [IUD String] : an IUD string was noted [Normal] : normal [Uterine Adnexae] : normal

## 2022-12-08 NOTE — REVIEW OF SYSTEMS
[Patient Intake Form Reviewed] : Patient intake form was reviewed [Decreased Libido] : decreased libido [Negative] : Heme/Lymph

## 2023-01-27 ENCOUNTER — APPOINTMENT (OUTPATIENT)
Dept: MRI IMAGING | Facility: CLINIC | Age: 38
End: 2023-01-27
Payer: COMMERCIAL

## 2023-01-27 PROCEDURE — A9585: CPT

## 2023-01-27 PROCEDURE — 77049 MRI BREAST C-+ W/CAD BI: CPT

## 2023-01-30 NOTE — OB PST NOTE - ANESTHESIA, PREVIOUS REACTION, PROFILE
[address] Adult  Hospitalist Group                                                                                          Hospitalist Progress Note  DO Iliana Hood service: [phone] OR [phone] from in house phone        Date of Service:  2023  NAME:  Jazmyne Dey  :  1947  MRN:  [id number]      Admission Summary:   \"70year-old woman with past medical history significant for hypertension, anxiety/depression, dyslipidemia, restless legs syndrome presented at the outside facility emergency room with slurred speech and left arm weakness. These symptoms lasted for approximately 30 minutes and occurred one hour before coming to the emergency room. When the patient arrived at the emergency room, code stroke was activated. The CT scan of the head without contrast was obtained. This did not show acute pathology. The CTA of the head and neck was then obtained which shows severe right internal carotid artery stenosis. The patient was transferred to [facility] for further evaluation. The patient has no record of prior admission to this hospital.  The patient denies history of prior stroke. \"          Interval history / Subjective: Follow up CVA  Patient seen and examined  Denies neurological deficits  Denies recent illness  Reviewed MRI, echo with patient and neurology, neuroIR while in room        Assessment & Plan:     CVA  Right internal carotid artery stenosis  Mitral valve vegetation   -presenting with left arm weakness, slurred speech  -MRI brain with multiple small foci of acute infarction throughout the right middle cerebral artery territory- neuro consulted  -CTA brain with severe right internal carotid artery stenosis- neuroIR consulted and following for possible stent placement   -Echo with 1.9 x 1.4 cm moderately sized vegetation that is mobile and irregular on the anterior leaflet. Cardiology consulted.  Blood cultures ordered   -LDL elevated, continue statin   -continue home plavix, aspirin added    Acute cystitis w/ hematuria - follow culture, continue ceftriaxone   Acute renal insufficiency: unknown baseline, monitor  HTN- holding home medications   HLD- statin   CAD s/p stent - plavix  Restless leg- sinemet  Anxiety depression- wellbutrin  Anemia- stable, monitor     Additional time spent >30 minutes       Code status: full   Prophylaxis: lovenox  Care Plan discussed with: patient, neuro, neuroIR  Anticipated Disposition: >48 hours     Hospital Problems  Date Reviewed: 1/30/2023            Codes Class Noted POA    * (Principal) Acute CVA (cerebrovascular accident) [facility]) ICD-10-CM: I63.9  ICD-9-CM: 434.91  1/30/2023 Yes        Internal carotid artery stenosis, right ICD-10-CM: B46.99  ICD-9-CM: 433.10  1/29/2023 Unknown               Review of Systems:   Negative unless stated above         Vital Signs:    Last 24hrs VS reviewed since prior progress note. Most recent are:  Visit Vitals  /76   Pulse 79   Temp 98.3 °F (36.8 °C)   Resp 15   Ht 5' 5\" (1.651 m)   Wt 76.7 kg (169 lb)   SpO2 94%   BMI 28.12 kg/m²         Intake/Output Summary (Last 24 hours) at 1/30/2023 1657  Last data filed at 1/30/2023 0808  Gross per 24 hour   Intake --   Output 650 ml   Net -650 ml        Physical Examination:     I had a face to face encounter with this patient and independently examined them on 1/30/2023 as outlined below:          Constitutional:  No acute distress, cooperative, pleasant    ENT:  Oral mucosa moist, oropharynx benign. Resp:  CTA bilaterally. No wheezing/rhonchi/rales. No accessory muscle use. CV:  Regular rhythm, normal rate, no murmurs, gallops, rubs    GI:  Soft, non distended, non tender.  normoactive bowel sounds, no hepatosplenomegaly     Musculoskeletal:  No edema, warm, 2+ pulses throughout    Neurologic:  Moves all extremities            Data Review:    Review and/or order of clinical lab test  Review and/or order of tests in the radiology section of CPT  Review and/or order of tests in the medicine section of CPT    I have independently reviewed and interpreted patient's lab and all other diagnostic data    Notes reviewed from all clinical/nonclinical/nursing services involved in patient's clinical care. Care coordination discussions were held with appropriate clinical/nonclinical/ nursing providers based on care coordination needs. Labs:     Recent Labs     01/29/23 2009   WBC 6.4   HGB 10.8*   HCT 32.4*        Recent Labs     01/30/23  0409 01/29/23 2009   NA  --  139   K  --  3.3*   CL  --  105   CO2  --  27   BUN  --  17   CREA  --  1.27*   GLU  --  97   CA  --  9.1   MG 2.3  --    PHOS 3.9  --      Recent Labs     01/29/23 2009   ALT 24   AP 57   TBILI 0.3   TP 6.8   ALB 3.5   GLOB 3.3     Recent Labs     01/29/23 2009   INR 1.1   PTP 14.2      Recent Labs     01/30/23  0630 01/30/23 0409   TIBC  --  334   PSAT  --  16*   FERR 38  --       Lab Results   Component Value Date/Time    Folate 11.5 01/30/2023 06:30 AM      No results for input(s): PH, PCO2, PO2 in the last 72 hours. No results for input(s): CPK, CKNDX, TROIQ in the last 72 hours.     No lab exists for component: CPKMB  Lab Results   Component Value Date/Time    Cholesterol, total 191 01/30/2023 04:09 AM    HDL Cholesterol 38 01/30/2023 04:09 AM    LDL, calculated 105.4 (H) 01/30/2023 04:09 AM    Triglyceride 238 (H) 01/30/2023 04:09 AM    CHOL/HDL Ratio 5.0 01/30/2023 04:09 AM     Lab Results   Component Value Date/Time    Glucose (POC) 107 (H) 01/29/2023 07:34 PM    Glucose (POC) 78 08/10/2013 03:12 PM     Lab Results   Component Value Date/Time    Color Yellow/Straw 01/29/2023 08:09 PM    Appearance Hazy (A) 01/29/2023 08:09 PM    Specific gravity 1.010 01/29/2023 08:09 PM    pH (UA) 5.0 01/29/2023 08:09 PM    Protein Negative 01/29/2023 08:09 PM    Glucose Negative 01/29/2023 08:09 PM    Ketone Negative 01/29/2023 08:09 PM    Bilirubin Negative 01/29/2023 08:09 PM    Urobilinogen 0.1 (L) 01/29/2023 08:09 PM    Nitrites Positive (A) 01/29/2023 08:09 PM    Leukocyte Esterase Large (A) 01/29/2023 08:09 PM    Epithelial cells Many (A) 01/29/2023 08:09 PM    Bacteria 1+ (A) 01/29/2023 08:09 PM    WBC >100 (H) 01/29/2023 08:09 PM    RBC 10-20 01/29/2023 08:09 PM         Medications Reviewed:     Current Facility-Administered Medications   Medication Dose Route Frequency    ARIPiprazole (ABILIFY) tablet 15 mg  15 mg Oral DAILY    buPROPion SR (WELLBUTRIN SR) tablet 150 mg  150 mg Oral QHS    carbidopa-levodopa (SINEMET)  mg per tablet 1 Tablet  1 Tablet Oral DAILY    clopidogreL (PLAVIX) tablet 75 mg  75 mg Oral DAILY    escitalopram oxalate (LEXAPRO) tablet 20 mg  20 mg Oral DAILY    pantoprazole (PROTONIX) tablet 40 mg  40 mg Oral ACB    acetaminophen (TYLENOL) tablet 650 mg  650 mg Oral Q4H PRN    Or    acetaminophen (TYLENOL) solution 650 mg  650 mg Per NG tube Q4H PRN    Or    acetaminophen (TYLENOL) suppository 650 mg  650 mg Rectal Q4H PRN    ondansetron (ZOFRAN) injection 4 mg  4 mg IntraVENous Q6H PRN    aspirin chewable tablet 81 mg  81 mg Oral DAILY    bisacodyL (DULCOLAX) tablet 5 mg  5 mg Oral DAILY PRN    enoxaparin (LOVENOX) injection 40 mg  40 mg SubCUTAneous Q24H    cefTRIAXone (ROCEPHIN) 1 g in 0.9% sodium chloride 10 mL IV syringe  1 g IntraVENous Q24H    lactated Ringers infusion  75 mL/hr IntraVENous CONTINUOUS    buPROPion SR (WELLBUTRIN SR) tablet 300 mg  300 mg Oral DAILY    atorvastatin (LIPITOR) tablet 40 mg  40 mg Oral QHS     Current Outpatient Medications   Medication Sig    buPROPion SR (WELLBUTRIN SR) 150 mg SR tablet Take 150 mg by mouth nightly. clopidogreL (PLAVIX) 75 mg tab Take 75 mg by mouth daily. chlorthalidone (HYGROTON) 25 mg tablet Take 25 mg by mouth daily. ARIPiprazole (ABILIFY) 15 mg tablet     buPROPion SR (WELLBUTRIN SR) 150 mg SR tablet Take 300 mg by mouth daily.     carbidopa-levodopa (SINEMET)  mg per tablet Take 1 Tablet by mouth nightly. ascorbic acid, vitamin C, (VITAMIN C) 500 mg tablet 500 mg nightly. turmeric root extract 500 mg cap 1 tablet    vitamins a & d cap 1 capsule    escitalopram oxalate (LEXAPRO) 20 mg tablet Take 20 mg by mouth daily. esomeprazole (NEXIUM) 40 mg capsule Take 40 mg by mouth Daily (before breakfast). Indications: gastroesophageal reflux disease    pravastatin (PRAVACHOL) 20 mg tablet Take 20 mg by mouth nightly. Indications: high cholesterol    metoprolol tartrate (LOPRESSOR) 25 mg tablet Take 25 mg by mouth two (2) times a day. cyclobenzaprine (FLEXERIL) 10 mg tablet Take 10 mg by mouth two (2) times daily as needed for Muscle Spasm(s) (back pain). diclofenac (VOLTAREN) 1 % gel Apply 2 g to affected area two (2) times daily as needed for Pain (to knees and feet as needed for pain). ergocalciferol (ERGOCALCIFEROL) 1,250 mcg (50,000 unit) capsule Take 50,000 Units by mouth every Monday and Thursday. ferrous sulfate 325 mg (65 mg iron) tablet Take 325 mg by mouth daily (with breakfast). gabapentin (NEURONTIN) 600 mg tablet Take 600 mg by mouth daily as needed for Pain (BACK PAIN). prochlorperazine (COMPAZINE) 5 mg tablet 1-2 tabs po every 6 hours prn nausea    traMADol (ULTRAM) 50 mg tablet Take 1 Tab by mouth every six (6) hours as needed for Pain. (Patient taking differently: Take 50 mg by mouth three (3) times daily as needed.  Indications: pain)     ______________________________________________________________________  EXPECTED LENGTH OF STAY: - - -  ACTUAL LENGTH OF STAY:          0                 Marielle Guillaume DO none

## 2023-03-17 ENCOUNTER — APPOINTMENT (OUTPATIENT)
Dept: INTERNAL MEDICINE | Facility: CLINIC | Age: 38
End: 2023-03-17
Payer: COMMERCIAL

## 2023-03-17 ENCOUNTER — APPOINTMENT (OUTPATIENT)
Dept: ENDOCRINOLOGY | Facility: CLINIC | Age: 38
End: 2023-03-17
Payer: COMMERCIAL

## 2023-03-17 VITALS
HEART RATE: 71 BPM | DIASTOLIC BLOOD PRESSURE: 79 MMHG | WEIGHT: 205 LBS | BODY MASS INDEX: 36.32 KG/M2 | HEIGHT: 63 IN | SYSTOLIC BLOOD PRESSURE: 116 MMHG | OXYGEN SATURATION: 97 % | TEMPERATURE: 98.2 F

## 2023-03-17 VITALS
HEART RATE: 83 BPM | SYSTOLIC BLOOD PRESSURE: 115 MMHG | HEIGHT: 63 IN | TEMPERATURE: 97.6 F | WEIGHT: 205 LBS | BODY MASS INDEX: 36.32 KG/M2 | DIASTOLIC BLOOD PRESSURE: 70 MMHG | OXYGEN SATURATION: 98 %

## 2023-03-17 DIAGNOSIS — N92.6 IRREGULAR MENSTRUATION, UNSPECIFIED: ICD-10-CM

## 2023-03-17 PROCEDURE — 99204 OFFICE O/P NEW MOD 45 MIN: CPT

## 2023-03-17 PROCEDURE — 99204 OFFICE O/P NEW MOD 45 MIN: CPT | Mod: 25

## 2023-03-17 PROCEDURE — 95251 CONT GLUC MNTR ANALYSIS I&R: CPT

## 2023-03-17 NOTE — PHYSICAL EXAM
[No Acute Distress] : no acute distress [Normal Sclera/Conjunctiva] : normal sclera/conjunctiva [Normal Outer Ear/Nose] : the outer ears and nose were normal in appearance [No JVD] : no jugular venous distention [No Lymphadenopathy] : no lymphadenopathy [Supple] : supple [No Respiratory Distress] : no respiratory distress  [No Accessory Muscle Use] : no accessory muscle use [Clear to Auscultation] : lungs were clear to auscultation bilaterally [Normal Rate] : normal rate  [Regular Rhythm] : with a regular rhythm [Normal S1, S2] : normal S1 and S2 [No Edema] : there was no peripheral edema [Soft] : abdomen soft [Non Tender] : non-tender [No CVA Tenderness] : no CVA  tenderness [No Joint Swelling] : no joint swelling [No Rash] : no rash [Coordination Grossly Intact] : coordination grossly intact

## 2023-03-17 NOTE — HISTORY OF PRESENT ILLNESS
[Spouse] : spouse [FreeTextEntry1] : Establish care [de-identified] : 37 year old female with PMHx of gestational diabetes, migraines, depression who presents to establish care.  Patient reports a chronic, 5/10 left sided rib pain.  She has had this for several years and has seen many specialists such as neurologist and pain management physician for this complaint.  She recently had dizziness and light headedness for which she went to the ER recently.  Currently she has no acute medical complaints. She does report she has a CGM which would give low readings of glucose level (40-50's at night time).

## 2023-03-17 NOTE — HEALTH RISK ASSESSMENT
[Fair] :  ~his/her~ mood as fair [Yes] : Yes [2 - 4 times a month (2 pts)] : 2-4 times a month (2 points) [1 or 2 (0 pts)] : 1 or 2 (0 points) [Never (0 pts)] : Never (0 points) [1] : 1) Little interest or pleasure doing things for several days (1) [0] : 2) Feeling down, depressed, or hopeless: Not at all (0) [PHQ-2 Negative - No further assessment needed] : PHQ-2 Negative - No further assessment needed [Never] : Never [[id number]] : 1

## 2023-03-17 NOTE — HISTORY OF PRESENT ILLNESS
[FreeTextEntry1] : 37 Y old female with Hx of gestational DM\par \par She reports that she signed up for a nutrition program and was given a CGM/ freestyle nupur\par She noticed that at times there would be readings in the 50's (per CGM tracing only occurred one night)\par This was not confirmed with a glucometer\par \par She has multiple other complaints including chronic pain in arm, fatigue, anxiety\par \par She was evaluated urgently for multiple complaints, and reports that glucose readings were in the 80's while the CGM reported values 10-20mg/dl lower and is concerned about this \par \par \par \par Device: Abbott Freestyle Nupur \par \par Glucose Management Indicator (GMI): 5.8% \par Average Bmg/dl \par \par TIR:  \par TIR >250 mg/dl: 0% \par TIR >180mg/dl: 0% \par TIR 70 to 180mg/dl: 98% \par TIR <70mg/dl:  1% \par TIR <54mg/dl: 1 % \par \par Coefficient of variation: 16.9% \par \par Time (%) CGM active: 94\par \par \par

## 2023-03-17 NOTE — REVIEW OF SYSTEMS
[Vision Problems] : vision problems [Nosebleed] : nosebleed [Shortness Of Breath] : shortness of breath [Headache] : headache [Dizziness] : dizziness [Anxiety] : anxiety [Fever] : no fever [Chills] : no chills [Chest Pain] : no chest pain [Palpitations] : no palpitations [Wheezing] : no wheezing [Cough] : no cough [Nausea] : no nausea [Dysuria] : no dysuria [Incontinence] : no incontinence [Itching] : no itching [Skin Rash] : no skin rash [Easy Bleeding] : no easy bleeding [Easy Bruising] : no easy bruising

## 2023-03-17 NOTE — PLAN
[FreeTextEntry1] : Depression - c/w cymbalta 90 mg\par H/o Gestation DM/Fatigue/dizziness - will check labs

## 2023-03-20 ENCOUNTER — NON-APPOINTMENT (OUTPATIENT)
Age: 38
End: 2023-03-20

## 2023-03-20 LAB
ALBUMIN SERPL ELPH-MCNC: 5 G/DL
ALP BLD-CCNC: 63 U/L
ALT SERPL-CCNC: 14 U/L
ANION GAP SERPL CALC-SCNC: 15 MMOL/L
APPEARANCE: CLEAR
AST SERPL-CCNC: 15 U/L
BACTERIA: NEGATIVE
BASOPHILS # BLD AUTO: 0.04 K/UL
BASOPHILS NFR BLD AUTO: 0.6 %
BILIRUB SERPL-MCNC: 0.2 MG/DL
BILIRUBIN URINE: NEGATIVE
BLOOD URINE: NEGATIVE
BUN SERPL-MCNC: 9 MG/DL
CALCIUM SERPL-MCNC: 10 MG/DL
CHLORIDE SERPL-SCNC: 101 MMOL/L
CHOLEST SERPL-MCNC: 250 MG/DL
CO2 SERPL-SCNC: 24 MMOL/L
COLOR: YELLOW
CREAT SERPL-MCNC: 0.57 MG/DL
EGFR: 121 ML/MIN/1.73M2
EOSINOPHIL # BLD AUTO: 0.13 K/UL
EOSINOPHIL NFR BLD AUTO: 1.9 %
ESTIMATED AVERAGE GLUCOSE: 108 MG/DL
FERRITIN SERPL-MCNC: 49 NG/ML
FOLATE SERPL-MCNC: >20 NG/ML
GLUCOSE QUALITATIVE U: NEGATIVE
GLUCOSE SERPL-MCNC: 91 MG/DL
HBA1C MFR BLD HPLC: 5.4 %
HCT VFR BLD CALC: 41.1 %
HCT VFR BLD CALC: 41.5 %
HDLC SERPL-MCNC: 68 MG/DL
HGB BLD-MCNC: 13.3 G/DL
HGB BLD-MCNC: 13.4 G/DL
HYALINE CASTS: 0 /LPF
IMM GRANULOCYTES NFR BLD AUTO: 0.1 %
IRON SATN MFR SERPL: 15 %
IRON SERPL-MCNC: 61 UG/DL
KETONES URINE: NEGATIVE
LDLC SERPL CALC-MCNC: 139 MG/DL
LEUKOCYTE ESTERASE URINE: NEGATIVE
LYMPHOCYTES # BLD AUTO: 2.74 K/UL
LYMPHOCYTES NFR BLD AUTO: 39.5 %
MAN DIFF?: NORMAL
MCHC RBC-ENTMCNC: 29.6 PG
MCHC RBC-ENTMCNC: 30 PG
MCHC RBC-ENTMCNC: 32 GM/DL
MCHC RBC-ENTMCNC: 32.6 GM/DL
MCV RBC AUTO: 91.9 FL
MCV RBC AUTO: 92.4 FL
MICROSCOPIC-UA: NORMAL
MONOCYTES # BLD AUTO: 0.33 K/UL
MONOCYTES NFR BLD AUTO: 4.8 %
NEUTROPHILS # BLD AUTO: 3.68 K/UL
NEUTROPHILS NFR BLD AUTO: 53.1 %
NITRITE URINE: NEGATIVE
NONHDLC SERPL-MCNC: 182 MG/DL
PH URINE: 6.5
PLATELET # BLD AUTO: 312 K/UL
PLATELET # BLD AUTO: 324 K/UL
POTASSIUM SERPL-SCNC: 4.6 MMOL/L
PROT SERPL-MCNC: 7.5 G/DL
PROTEIN URINE: NEGATIVE
RBC # BLD: 4.47 M/UL
RBC # BLD: 4.49 M/UL
RBC # FLD: 13.7 %
RBC # FLD: 13.8 %
RED BLOOD CELLS URINE: 4 /HPF
SODIUM SERPL-SCNC: 140 MMOL/L
SPECIFIC GRAVITY URINE: 1.01
SQUAMOUS EPITHELIAL CELLS: 0 /HPF
T4 FREE SERPL-MCNC: 1 NG/DL
TIBC SERPL-MCNC: 395 UG/DL
TRIGL SERPL-MCNC: 215 MG/DL
TSH SERPL-ACNC: 1.34 UIU/ML
UIBC SERPL-MCNC: 334 UG/DL
UROBILINOGEN URINE: NORMAL
VIT B12 SERPL-MCNC: 461 PG/ML
WBC # FLD AUTO: 6.93 K/UL
WBC # FLD AUTO: 7.26 K/UL
WHITE BLOOD CELLS URINE: 0 /HPF

## 2023-06-26 ENCOUNTER — RX RENEWAL (OUTPATIENT)
Age: 38
End: 2023-06-26

## 2023-06-27 ENCOUNTER — NON-APPOINTMENT (OUTPATIENT)
Age: 38
End: 2023-06-27

## 2023-08-18 ENCOUNTER — APPOINTMENT (OUTPATIENT)
Dept: INTERNAL MEDICINE | Facility: CLINIC | Age: 38
End: 2023-08-18
Payer: COMMERCIAL

## 2023-08-18 ENCOUNTER — NON-APPOINTMENT (OUTPATIENT)
Age: 38
End: 2023-08-18

## 2023-08-18 ENCOUNTER — APPOINTMENT (OUTPATIENT)
Dept: ENDOCRINOLOGY | Facility: CLINIC | Age: 38
End: 2023-08-18
Payer: COMMERCIAL

## 2023-08-18 VITALS
SYSTOLIC BLOOD PRESSURE: 100 MMHG | BODY MASS INDEX: 35.26 KG/M2 | HEIGHT: 63 IN | DIASTOLIC BLOOD PRESSURE: 70 MMHG | OXYGEN SATURATION: 97 % | HEART RATE: 77 BPM | WEIGHT: 199 LBS | TEMPERATURE: 97.3 F

## 2023-08-18 VITALS
SYSTOLIC BLOOD PRESSURE: 110 MMHG | WEIGHT: 195 LBS | HEIGHT: 63 IN | BODY MASS INDEX: 34.55 KG/M2 | DIASTOLIC BLOOD PRESSURE: 80 MMHG | OXYGEN SATURATION: 98 % | HEART RATE: 71 BPM | TEMPERATURE: 97 F

## 2023-08-18 DIAGNOSIS — E66.9 OBESITY, UNSPECIFIED: ICD-10-CM

## 2023-08-18 PROCEDURE — 99214 OFFICE O/P EST MOD 30 MIN: CPT

## 2023-08-18 NOTE — HISTORY OF PRESENT ILLNESS
[FreeTextEntry1] : 37 year F here for assessment of obesity   Follows a special diet: None Food Cravings: No  Tried to lose weight before: Yes   Tried any diet plans/ meal replacements for weight control: No Tried OTC or prescription medication for weight control: No Activity level: typically light activity with no scheduled physical activity  Ease of skin bruising: No Proximal muscle weakness: No Prior weight loss surgery: No     History of the following:   Thyroid disease: No Heart disease: No Mood disorder: yes Hypertension: No Seizures: No Gall bladder disease: No Known diabetic retinopathy: No Pancreatitis: No  Patient comes in with mildly elevated insulin level done with PCP, for unclear reason. No symptoms to suggest recurrent hypoglycemia.

## 2023-08-18 NOTE — PHYSICAL EXAM
[No Acute Distress] : no acute distress [Normal Sclera/Conjunctiva] : normal sclera/conjunctiva [Normal Outer Ear/Nose] : the outer ears and nose were normal in appearance [No JVD] : no jugular venous distention [No Respiratory Distress] : no respiratory distress  [No Accessory Muscle Use] : no accessory muscle use [Clear to Auscultation] : lungs were clear to auscultation bilaterally [Normal Rate] : normal rate  [Regular Rhythm] : with a regular rhythm [Normal S1, S2] : normal S1 and S2 [No Edema] : there was no peripheral edema [Soft] : abdomen soft [Normal Anterior Cervical Nodes] : no anterior cervical lymphadenopathy [No CVA Tenderness] : no CVA  tenderness [No Joint Swelling] : no joint swelling [No Rash] : no rash

## 2023-08-31 NOTE — PLAN
[FreeTextEntry1] : Anxiety/depression - pt prefers to titrate cymbalta and start welbutrin.  Will start titrating cymbalta and if improving, will start welbutrin at next visit in 2-3 weeks, refer to psychiatrist for further management Migraine headaches - occurs every other week, c/w ubrelvy prn, f/u with neurologist Obesity - going to start wegovy, f/u with endocrinologist

## 2023-08-31 NOTE — REVIEW OF SYSTEMS
[Back Pain] : back pain [Fever] : no fever [Night Sweats] : no night sweats [Discharge] : no discharge [Chest Pain] : no chest pain [Orthopnea] : no orthopnea [Shortness Of Breath] : no shortness of breath [Abdominal Pain] : no abdominal pain [Vomiting] : no vomiting [Dysuria] : no dysuria [Hematuria] : no hematuria [Itching] : no itching [Skin Rash] : no skin rash [Headache] : no headache [Memory Loss] : no memory loss

## 2023-08-31 NOTE — HISTORY OF PRESENT ILLNESS
[FreeTextEntry1] : follow-up [de-identified] : 37 year old female here for a f/u visit. Pt reports she is concerned about weight gain, lack of energy, lack of sex drive that she attributes to cymbalta. She has been on cymbalta for about 2 years since her baby was born (PPD).  Currently she denies any chest pain, palpitations, or shortness of breath.  39w1d

## 2023-09-08 ENCOUNTER — NON-APPOINTMENT (OUTPATIENT)
Age: 38
End: 2023-09-08

## 2023-09-08 ENCOUNTER — LABORATORY RESULT (OUTPATIENT)
Age: 38
End: 2023-09-08

## 2023-09-08 ENCOUNTER — APPOINTMENT (OUTPATIENT)
Dept: INTERNAL MEDICINE | Facility: CLINIC | Age: 38
End: 2023-09-08
Payer: COMMERCIAL

## 2023-09-08 VITALS
HEART RATE: 85 BPM | DIASTOLIC BLOOD PRESSURE: 68 MMHG | HEIGHT: 63 IN | WEIGHT: 195 LBS | OXYGEN SATURATION: 99 % | BODY MASS INDEX: 34.55 KG/M2 | SYSTOLIC BLOOD PRESSURE: 102 MMHG

## 2023-09-08 PROCEDURE — 93000 ELECTROCARDIOGRAM COMPLETE: CPT

## 2023-09-08 PROCEDURE — 99214 OFFICE O/P EST MOD 30 MIN: CPT | Mod: 25

## 2023-09-08 NOTE — HISTORY OF PRESENT ILLNESS
[FreeTextEntry1] : follow up of anxiety/depression [de-identified] : 38 year old female here for a f/u visit. She reports she still feels anxious at times, worse at the end of the day.

## 2023-09-11 LAB
ALBUMIN SERPL ELPH-MCNC: 4.7 G/DL
ALP BLD-CCNC: 64 U/L
ALT SERPL-CCNC: 15 U/L
ANION GAP SERPL CALC-SCNC: 11 MMOL/L
AST SERPL-CCNC: 16 U/L
BILIRUB SERPL-MCNC: 0.3 MG/DL
BUN SERPL-MCNC: 10 MG/DL
CALCIUM SERPL-MCNC: 9.7 MG/DL
CHLORIDE SERPL-SCNC: 102 MMOL/L
CHOLEST SERPL-MCNC: 221 MG/DL
CO2 SERPL-SCNC: 26 MMOL/L
CREAT SERPL-MCNC: 0.57 MG/DL
EGFR: 121 ML/MIN/1.73M2
ESTIMATED AVERAGE GLUCOSE: 105 MG/DL
GLUCOSE SERPL-MCNC: 98 MG/DL
HBA1C MFR BLD HPLC: 5.3 %
HCT VFR BLD CALC: 40 %
HDLC SERPL-MCNC: 58 MG/DL
HGB BLD-MCNC: 12.8 G/DL
LDLC SERPL CALC-MCNC: 121 MG/DL
MCHC RBC-ENTMCNC: 29.7 PG
MCHC RBC-ENTMCNC: 32 GM/DL
MCV RBC AUTO: 92.8 FL
NONHDLC SERPL-MCNC: 163 MG/DL
PLATELET # BLD AUTO: 286 K/UL
POTASSIUM SERPL-SCNC: 4.2 MMOL/L
PROT SERPL-MCNC: 7.7 G/DL
RBC # BLD: 4.31 M/UL
RBC # FLD: 13.2 %
SODIUM SERPL-SCNC: 139 MMOL/L
TRIGL SERPL-MCNC: 244 MG/DL
TSH SERPL-ACNC: 1.56 UIU/ML
VIT B12 SERPL-MCNC: 353 PG/ML
WBC # FLD AUTO: 6.03 K/UL

## 2023-09-22 ENCOUNTER — APPOINTMENT (OUTPATIENT)
Dept: INTERNAL MEDICINE | Facility: CLINIC | Age: 38
End: 2023-09-22

## 2023-09-22 ENCOUNTER — APPOINTMENT (OUTPATIENT)
Dept: INTERNAL MEDICINE | Facility: CLINIC | Age: 38
End: 2023-09-22
Payer: COMMERCIAL

## 2023-09-22 PROCEDURE — 99441: CPT

## 2023-10-12 ENCOUNTER — APPOINTMENT (OUTPATIENT)
Dept: ENDOCRINOLOGY | Facility: CLINIC | Age: 38
End: 2023-10-12

## 2023-10-18 LAB — CELIACPAN: NORMAL

## 2023-10-18 RX ORDER — BUPROPION HYDROCHLORIDE 100 MG/1
100 TABLET, FILM COATED ORAL
Qty: 270 | Refills: 0 | Status: DISCONTINUED | COMMUNITY
Start: 2023-08-31 | End: 2023-10-18

## 2024-01-08 ENCOUNTER — TRANSCRIPTION ENCOUNTER (OUTPATIENT)
Age: 39
End: 2024-01-08

## 2024-01-08 ENCOUNTER — RESULT REVIEW (OUTPATIENT)
Age: 39
End: 2024-01-08

## 2024-01-09 ENCOUNTER — TRANSCRIPTION ENCOUNTER (OUTPATIENT)
Age: 39
End: 2024-01-09

## 2024-01-15 ENCOUNTER — RX RENEWAL (OUTPATIENT)
Age: 39
End: 2024-01-15

## 2024-01-19 ENCOUNTER — APPOINTMENT (OUTPATIENT)
Dept: ULTRASOUND IMAGING | Facility: CLINIC | Age: 39
End: 2024-01-19
Payer: COMMERCIAL

## 2024-01-19 ENCOUNTER — APPOINTMENT (OUTPATIENT)
Dept: MAMMOGRAPHY | Facility: CLINIC | Age: 39
End: 2024-01-19
Payer: COMMERCIAL

## 2024-01-19 ENCOUNTER — RESULT REVIEW (OUTPATIENT)
Age: 39
End: 2024-01-19

## 2024-01-19 PROCEDURE — 76641 ULTRASOUND BREAST COMPLETE: CPT | Mod: 50

## 2024-01-19 PROCEDURE — 77066 DX MAMMO INCL CAD BI: CPT

## 2024-01-19 PROCEDURE — G0279: CPT

## 2024-01-31 ENCOUNTER — APPOINTMENT (OUTPATIENT)
Dept: OBGYN | Facility: CLINIC | Age: 39
End: 2024-01-31
Payer: COMMERCIAL

## 2024-01-31 VITALS
HEIGHT: 63 IN | DIASTOLIC BLOOD PRESSURE: 62 MMHG | BODY MASS INDEX: 31.89 KG/M2 | WEIGHT: 180 LBS | SYSTOLIC BLOOD PRESSURE: 90 MMHG

## 2024-01-31 DIAGNOSIS — Z01.419 ENCOUNTER FOR GYNECOLOGICAL EXAMINATION (GENERAL) (ROUTINE) W/OUT ABNORMAL FINDINGS: ICD-10-CM

## 2024-01-31 DIAGNOSIS — Z11.51 ENCOUNTER FOR SCREENING FOR HUMAN PAPILLOMAVIRUS (HPV): ICD-10-CM

## 2024-01-31 PROCEDURE — 99395 PREV VISIT EST AGE 18-39: CPT

## 2024-02-04 PROBLEM — Z01.419 PAPANICOLAOU SMEAR, AS PART OF ROUTINE GYNECOLOGICAL EXAMINATION: Status: ACTIVE | Noted: 2024-01-31

## 2024-02-04 PROBLEM — Z11.51 ENCOUNTER FOR SCREENING FOR HUMAN PAPILLOMAVIRUS (HPV): Status: ACTIVE | Noted: 2022-05-25

## 2024-02-04 LAB
CYTOLOGY CVX/VAG DOC THIN PREP: NORMAL
HPV HIGH+LOW RISK DNA PNL CVX: NOT DETECTED

## 2024-02-04 NOTE — PLAN
[FreeTextEntry1] : 37 yo, annual visit, stable  HCM - IUD string noted (placed 10/4/22) - pap done today - vit D/exercise/calcium - breast mammo/sono utd - colon screening reviewed age 47 - f/u PCP for annual and appropriate immunizations - rto 1 year  Portillo Cline > 20% - yearly breast MRI - pt to call back for July MRI rx

## 2024-02-04 NOTE — HISTORY OF PRESENT ILLNESS
[FreeTextEntry1] : 39 yo present for annual visit. She is doing well and has no complaints. Pt has not had a period in over a year.  OBH: C/S x2 GYNH: Hx abnl paps; prior LEEP x2; hx colpo/bx; hx HPV but has cleared; hx genital warts, IUD placed 10/4/22 PMH: migraines PSH: C/s x2 (2017, 2021); left ovarian cystectomy (2012); knee surgery; foot surgery Allergies: narcotics, some local anesthetic; soy; exogenous estrogen  [Mammogramdate] : 1/24 [BreastSonogramDate] : 1/24 [PapSmeardate] : 5/22

## 2024-02-04 NOTE — END OF VISIT
[FreeTextEntry3] :    I, Samina Acosta, acted as a scribe on behalf of Dr. Rishi San on 01/31/2024.   All medical entries made by the scribe were at my, Dr. Rishi San's, direction and personally dictated by me on 01/31/2024. I have reviewed the chart and agree that the record accurately reflects my personal performance of the history, physical exam, assessment, and plan. I have also personally directed, reviewed, and agreed with the chart.

## 2024-03-24 LAB
BASOPHILS # BLD AUTO: 0.05 K/UL
BASOPHILS NFR BLD AUTO: 0.6 %
EOSINOPHIL # BLD AUTO: 0.1 K/UL
EOSINOPHIL NFR BLD AUTO: 1.2 %
HCT VFR BLD CALC: 38.8 %
HGB BLD-MCNC: 12.7 G/DL
IMM GRANULOCYTES NFR BLD AUTO: 0.2 %
LYMPHOCYTES # BLD AUTO: 2.82 K/UL
LYMPHOCYTES NFR BLD AUTO: 34.1 %
MAN DIFF?: NORMAL
MCHC RBC-ENTMCNC: 29.7 PG
MCHC RBC-ENTMCNC: 32.7 GM/DL
MCV RBC AUTO: 90.7 FL
MONOCYTES # BLD AUTO: 0.48 K/UL
MONOCYTES NFR BLD AUTO: 5.8 %
NEUTROPHILS # BLD AUTO: 4.8 K/UL
NEUTROPHILS NFR BLD AUTO: 58.1 %
PLATELET # BLD AUTO: 328 K/UL
RBC # BLD: 4.28 M/UL
RBC # FLD: 13.4 %
WBC # FLD AUTO: 8.27 K/UL

## 2024-04-15 ENCOUNTER — TRANSCRIPTION ENCOUNTER (OUTPATIENT)
Age: 39
End: 2024-04-15

## 2024-05-16 ENCOUNTER — NON-APPOINTMENT (OUTPATIENT)
Age: 39
End: 2024-05-16

## 2024-05-24 ENCOUNTER — NON-APPOINTMENT (OUTPATIENT)
Age: 39
End: 2024-05-24

## 2024-05-24 ENCOUNTER — APPOINTMENT (OUTPATIENT)
Dept: PEDIATRIC ALLERGY IMMUNOLOGY | Facility: CLINIC | Age: 39
End: 2024-05-24
Payer: COMMERCIAL

## 2024-05-24 DIAGNOSIS — Z91.018 ALLERGY TO OTHER FOODS: ICD-10-CM

## 2024-05-24 DIAGNOSIS — T50.905A ADVERSE EFFECT OF UNSPECIFIED DRUGS, MEDICAMENTS AND BIOLOGICAL SUBSTANCES, INITIAL ENCOUNTER: ICD-10-CM

## 2024-05-24 DIAGNOSIS — T78.3XXA ANGIONEUROTIC EDEMA, INITIAL ENCOUNTER: ICD-10-CM

## 2024-05-24 PROCEDURE — 99213 OFFICE O/P EST LOW 20 MIN: CPT

## 2024-05-24 NOTE — PHYSICAL EXAM
[Alert] : alert [Healthy Appearance] : healthy appearance [No Acute Distress] : no acute distress [Normal Pupil & Iris Size/Symmetry] : normal pupil and iris size and symmetry [No Discharge] : no discharge [Sclera Not Icteric] : sclera not icteric [Normal TMs] : both tympanic membranes were normal [Normal Outer Ear/Nose] : the ears and nose were normal in appearance [No Thrush] : no thrush [No LAD] : no lymphadenopathy [Supple] : the neck was supple [Normal Rate and Effort] : normal respiratory rhythm and effort [No Crackles] : no crackles [Bilateral Audible Breath Sounds] : bilateral audible breath sounds [Normal Rate] : heart rate was normal  [Normal S1, S2] : normal S1 and S2 [No murmur] : no murmur [Regular Rhythm] : with a regular rhythm [Skin Intact] : skin intact  [No Rash] : no rash [Normal Mood] : mood was normal [Normal Affect] : affect was normal [Alert, Awake, Oriented as Age-Appropriate] : alert, awake, oriented as age appropriate [Wheezing] : no wheezing was heard [de-identified] : Voice is raspy-getting over laryngitis [de-identified] : Inflamed nasal mucosa. +++tender to palpation over maxillary and frontal sinuses.

## 2024-05-24 NOTE — HISTORY OF PRESENT ILLNESS
[de-identified] : Ele presents with complaints of severe sinus pressure. Symptoms started over 1 week ago with sore throat, congested nose then she lost her voice. Was seen in [facility] one week ago and was instructed to use Mucinex. Has been using Mucinex since. She has not gotten any relief , her symptoms have worsened. Now with severe sinus pressure under eyes and congested cough. Cough is productive for thick discolored mucus. No fever.

## 2024-05-24 NOTE — ASSESSMENT
[FreeTextEntry1] : Postnasal drip cough secondary to sinusitis Biaxin 500mg BID for 10 days-take with food, add a probiotic Saline rinses

## 2024-06-28 ENCOUNTER — APPOINTMENT (OUTPATIENT)
Dept: INTERNAL MEDICINE | Facility: CLINIC | Age: 39
End: 2024-06-28
Payer: COMMERCIAL

## 2024-06-28 VITALS
TEMPERATURE: 98.1 F | BODY MASS INDEX: 31.01 KG/M2 | SYSTOLIC BLOOD PRESSURE: 118 MMHG | HEIGHT: 63 IN | WEIGHT: 175 LBS | OXYGEN SATURATION: 98 % | DIASTOLIC BLOOD PRESSURE: 75 MMHG | HEART RATE: 72 BPM | RESPIRATION RATE: 17 BRPM

## 2024-06-28 DIAGNOSIS — F32.9 MAJOR DEPRESSIVE DISORDER, SINGLE EPISODE, UNSPECIFIED: ICD-10-CM

## 2024-06-28 DIAGNOSIS — Z13.31 ENCOUNTER FOR SCREENING FOR DEPRESSION: ICD-10-CM

## 2024-06-28 DIAGNOSIS — Z87.09 PERSONAL HISTORY OF OTHER DISEASES OF THE RESPIRATORY SYSTEM: ICD-10-CM

## 2024-06-28 DIAGNOSIS — N93.9 ABNORMAL UTERINE AND VAGINAL BLEEDING, UNSPECIFIED: ICD-10-CM

## 2024-06-28 DIAGNOSIS — O99.810 ABNORMAL GLUCOSE COMPLICATING PREGNANCY: ICD-10-CM

## 2024-06-28 DIAGNOSIS — F41.9 ANXIETY DISORDER, UNSPECIFIED: ICD-10-CM

## 2024-06-28 DIAGNOSIS — F52.5 VAGINISMUS NOT DUE TO A SUBSTANCE OR KNOWN PHYSIOLOGICAL CONDITION: ICD-10-CM

## 2024-06-28 DIAGNOSIS — N39.0 URINARY TRACT INFECTION, SITE NOT SPECIFIED: ICD-10-CM

## 2024-06-28 DIAGNOSIS — Z87.898 PERSONAL HISTORY OF OTHER SPECIFIED CONDITIONS: ICD-10-CM

## 2024-06-28 DIAGNOSIS — Z00.00 ENCOUNTER FOR GENERAL ADULT MEDICAL EXAMINATION W/OUT ABNORMAL FINDINGS: ICD-10-CM

## 2024-06-28 DIAGNOSIS — M54.14 RADICULOPATHY, THORACIC REGION: ICD-10-CM

## 2024-06-28 DIAGNOSIS — Z87.42 PERSONAL HISTORY OF OTHER DISEASES OF THE FEMALE GENITAL TRACT: ICD-10-CM

## 2024-06-28 DIAGNOSIS — G89.29 PELVIC AND PERINEAL PAIN: ICD-10-CM

## 2024-06-28 DIAGNOSIS — R42 DIZZINESS AND GIDDINESS: ICD-10-CM

## 2024-06-28 DIAGNOSIS — G43.909 MIGRAINE, UNSPECIFIED, NOT INTRACTABLE, W/OUT STATUS MIGRAINOSUS: ICD-10-CM

## 2024-06-28 DIAGNOSIS — R10.2 PELVIC AND PERINEAL PAIN: ICD-10-CM

## 2024-06-28 PROCEDURE — 99385 PREV VISIT NEW AGE 18-39: CPT

## 2024-06-28 PROCEDURE — G0444 DEPRESSION SCREEN ANNUAL: CPT | Mod: 59

## 2024-06-28 PROCEDURE — 36415 COLL VENOUS BLD VENIPUNCTURE: CPT

## 2024-07-02 ENCOUNTER — TRANSCRIPTION ENCOUNTER (OUTPATIENT)
Age: 39
End: 2024-07-02

## 2024-07-02 LAB
ALBUMIN SERPL ELPH-MCNC: 4.9 G/DL
ALP BLD-CCNC: 62 U/L
ALT SERPL-CCNC: 12 U/L
ANION GAP SERPL CALC-SCNC: 12 MMOL/L
AST SERPL-CCNC: 14 U/L
BASOPHILS # BLD AUTO: 0.05 K/UL
BASOPHILS NFR BLD AUTO: 0.7 %
BILIRUB SERPL-MCNC: 0.5 MG/DL
BUN SERPL-MCNC: 11 MG/DL
CALCIUM SERPL-MCNC: 10.2 MG/DL
CHLORIDE SERPL-SCNC: 105 MMOL/L
CHOLEST SERPL-MCNC: 212 MG/DL
CO2 SERPL-SCNC: 23 MMOL/L
CREAT SERPL-MCNC: 0.76 MG/DL
EGFR: 104 ML/MIN/1.73M2
EOSINOPHIL # BLD AUTO: 0.08 K/UL
EOSINOPHIL NFR BLD AUTO: 1.1 %
ESTIMATED AVERAGE GLUCOSE: 105 MG/DL
GLUCOSE SERPL-MCNC: 100 MG/DL
HBA1C MFR BLD HPLC: 5.3 %
HCT VFR BLD CALC: 40.2 %
HDLC SERPL-MCNC: 57 MG/DL
HGB BLD-MCNC: 13.5 G/DL
IMM GRANULOCYTES NFR BLD AUTO: 0.3 %
LDLC SERPL CALC-MCNC: 131 MG/DL
LYMPHOCYTES # BLD AUTO: 2.54 K/UL
LYMPHOCYTES NFR BLD AUTO: 35.6 %
MAN DIFF?: NORMAL
MCHC RBC-ENTMCNC: 30.7 PG
MCHC RBC-ENTMCNC: 33.6 GM/DL
MCV RBC AUTO: 91.4 FL
MONOCYTES # BLD AUTO: 0.26 K/UL
MONOCYTES NFR BLD AUTO: 3.6 %
NEUTROPHILS # BLD AUTO: 4.18 K/UL
NEUTROPHILS NFR BLD AUTO: 58.7 %
NONHDLC SERPL-MCNC: 155 MG/DL
PLATELET # BLD AUTO: 323 K/UL
POTASSIUM SERPL-SCNC: 4.6 MMOL/L
PROT SERPL-MCNC: 7.5 G/DL
RBC # BLD: 4.4 M/UL
RBC # FLD: 13.2 %
SODIUM SERPL-SCNC: 139 MMOL/L
TRIGL SERPL-MCNC: 137 MG/DL
WBC # FLD AUTO: 7.13 K/UL

## 2024-07-03 ENCOUNTER — TRANSCRIPTION ENCOUNTER (OUTPATIENT)
Age: 39
End: 2024-07-03

## 2024-07-08 ENCOUNTER — TRANSCRIPTION ENCOUNTER (OUTPATIENT)
Age: 39
End: 2024-07-08

## 2024-07-08 DIAGNOSIS — Z12.39 ENCOUNTER FOR OTHER SCREENING FOR MALIGNANT NEOPLASM OF BREAST: ICD-10-CM

## 2024-07-29 ENCOUNTER — APPOINTMENT (OUTPATIENT)
Dept: OBGYN | Facility: CLINIC | Age: 39
End: 2024-07-29
Payer: COMMERCIAL

## 2024-07-29 VITALS
BODY MASS INDEX: 30.48 KG/M2 | HEIGHT: 63 IN | SYSTOLIC BLOOD PRESSURE: 115 MMHG | WEIGHT: 172 LBS | DIASTOLIC BLOOD PRESSURE: 72 MMHG

## 2024-07-29 DIAGNOSIS — R10.2 PELVIC AND PERINEAL PAIN: ICD-10-CM

## 2024-07-29 PROCEDURE — 99213 OFFICE O/P EST LOW 20 MIN: CPT

## 2024-07-29 NOTE — END OF VISIT
[FreeTextEntry3] : I, Joanna Gilbert, acted as a scribe on behalf of Dr. Dipti Thayer D.O, on 07/29/2024.  All medical entries made by the scribe were at my,  Dr. Dipti Thayer D.O, direction and personally dictated by me on 07/29/2024. I have reviewed the chart and agree that the record accurately reflects my personal performance of the history, physical exam, assessment and plan. I have also personally directed, reviewed, and agreed with the chart.

## 2024-07-29 NOTE — HISTORY OF PRESENT ILLNESS
[FreeTextEntry1] : 38 year old female presents for a follow-up evaluation. Pt c/o lower pelvic cramping that is progressively getting worse. Pt denies fever and chills, urinary symptoms, and constipation. Pt had a Mirena IUD placed on 10/04/2022.

## 2024-07-29 NOTE — PLAN
[FreeTextEntry1] : 38 year old female presents for a follow up.   -Rx pelvic sono -Ucx  F/u after sono

## 2024-07-30 ENCOUNTER — APPOINTMENT (OUTPATIENT)
Dept: ULTRASOUND IMAGING | Facility: CLINIC | Age: 39
End: 2024-07-30
Payer: COMMERCIAL

## 2024-07-30 PROCEDURE — 76856 US EXAM PELVIC COMPLETE: CPT

## 2024-07-30 PROCEDURE — 76830 TRANSVAGINAL US NON-OB: CPT

## 2024-08-06 ENCOUNTER — APPOINTMENT (OUTPATIENT)
Dept: OBGYN | Facility: CLINIC | Age: 39
End: 2024-08-06

## 2024-08-09 ENCOUNTER — APPOINTMENT (OUTPATIENT)
Dept: MRI IMAGING | Facility: CLINIC | Age: 39
End: 2024-08-09

## 2024-08-15 ENCOUNTER — APPOINTMENT (OUTPATIENT)
Dept: OBGYN | Facility: CLINIC | Age: 39
End: 2024-08-15
Payer: COMMERCIAL

## 2024-08-15 ENCOUNTER — ASOB RESULT (OUTPATIENT)
Age: 39
End: 2024-08-15

## 2024-08-15 VITALS
BODY MASS INDEX: 30.48 KG/M2 | WEIGHT: 172 LBS | SYSTOLIC BLOOD PRESSURE: 102 MMHG | DIASTOLIC BLOOD PRESSURE: 67 MMHG | HEART RATE: 76 BPM | HEIGHT: 63 IN

## 2024-08-15 PROCEDURE — 76998 US GUIDE INTRAOP: CPT

## 2024-08-15 PROCEDURE — 58300 INSERT INTRAUTERINE DEVICE: CPT

## 2024-08-15 PROCEDURE — 58301 REMOVE INTRAUTERINE DEVICE: CPT

## 2024-08-15 PROCEDURE — 81025 URINE PREGNANCY TEST: CPT

## 2024-08-15 NOTE — END OF VISIT
[FreeTextEntry3] : I, Kenia Conte, acted as a scribe on behalf of Dr. Dipti Thayer D.O. on 08/15/2024.   All medical entries made by the scribe were at my, Dr. Dipti Thayer D.O., direction and personally dictated by me on 08/15/2024. I have reviewed the chart and agree that the record accurately reflects my personal performance of the history, physical exam, assessment and plan. I have also personally directed, reviewed, and agreed with the chart.

## 2024-08-15 NOTE — PROCEDURE
[IUD Placement] : intrauterine device (IUD) placement [Mirena IUD] : Mirena IUD [Infection] : infection [Bleeding] : bleeding [Pain] : pain [Expulsion] : expulsion [Failure] : failure [Uterine Perforation] : uterine perforation [Neg Pregnancy Test] : negative pregnancy test [IUD Removal] : intrauterine device (IUD) removal [Time out performed] : Pre-procedure time out performed.  Patient's name, date of birth and procedure confirmed. [Consent Obtained] : Consent obtained [Risks] : risks [Benefits] : benefits [Alternatives] : alternatives [Patient] : patient [IUD Discarded] : IUD discarded [Tolerated Well] : Patient tolerated the procedure well [No Complications] : no complications [Heavy Vaginal Bleeding] : for heavy vaginal bleeding [Pelvic Pain] : for pelvic pain [Strings Visualized] : strings visualized [de-identified] : Ivan clamp [de-identified] : [id number] [de-identified] : 08/2026 [de-identified] : Malpositioned [de-identified] : IUD removed with allis clamp.

## 2024-09-03 ENCOUNTER — TRANSCRIPTION ENCOUNTER (OUTPATIENT)
Age: 39
End: 2024-09-03

## 2024-12-07 ENCOUNTER — NON-APPOINTMENT (OUTPATIENT)
Age: 39
End: 2024-12-07

## 2025-01-31 PROCEDURE — 0: CUSTOM

## 2025-02-03 ENCOUNTER — APPOINTMENT (OUTPATIENT)
Dept: INTERNAL MEDICINE | Facility: CLINIC | Age: 40
End: 2025-02-03

## 2025-02-18 NOTE — OB RN PATIENT PROFILE - HISTORY OF COVID-19 VACCINATION
Subjective:       Patient ID: Madelyn Aguilar is a 33 y.o. female.    Chief Complaint: Referral for syphilis      HPI 33 year old woman with PMH as listed above  She was born with congenital syphilis.   2024   RPR Quantitative 1:8 !  1:4 !         1.-  Component  Ref Range & Units (hover) 5 yr ago   RPR Quantitative 1:8 Abnormal      She got multiple treatments   Past Medical History:   Diagnosis Date    Abnormal Pap smear of cervix     Anemia     Arthritis     pau arms/legs    GERD (gastroesophageal reflux disease)     Herpes simplex virus (HSV) infection     Pelvic pain 2019    Syphilis, congenital      Past Surgical History:   Procedure Laterality Date     SECTION  2013    HYSTERECTOMY      LOOP ELECTROSURGICAL EXCISION PROCEDURE (LEEP)  2019    hsil on colpo    ROBOT-ASSISTED LAPAROSCOPIC ABDOMINAL HYSTERECTOMY USING DA SULEMA XI N/A 2019    Procedure: XI ROBOTIC HYSTERECTOMY;  Surgeon: Norma Chen MD;  Location: [facility] OR;  Service: OB/GYN;  Laterality: N/A;    ROBOT-ASSISTED SURGICAL REMOVAL OF FALLOPIAN TUBE USING DA SULEMA XI Bilateral 2019    Procedure: XI ROBOTIC SALPINGECTOMY;  Surgeon: Norma Chen MD;  Location: [facility] OR;  Service: OB/GYN;  Laterality: Bilateral;     Family History   Problem Relation Name Age of Onset    Hypertension Mother      Stroke Mother      Heart attack Mother      Cancer Mother          bile duct cancer    Heart murmur Father       Social History[1]  Review of Systems   Constitutional:  Negative for activity change, appetite change, chills, diaphoresis, fatigue and fever.   Respiratory:  Negative for apnea and chest tightness.    Musculoskeletal:  Negative for arthralgias and back pain.   Neurological:  Negative for dizziness and facial asymmetry.   Psychiatric/Behavioral:  Negative for agitation.        Objective:      Physical Exam  Vitals and nursing note reviewed.   Cardiovascular:      Rate and Rhythm: Normal 
rate.   Pulmonary:      Effort: Pulmonary effort is normal.   Musculoskeletal:         General: Normal range of motion.      Cervical back: Normal range of motion.   Skin:     General: Skin is warm.   Neurological:      General: No focal deficit present.      Mental Status: She is alert.         Assessment:      1. Syphilis, congenital  Ambulatory referral/consult to Infectious Disease              Plan:       1. Syphilis, congenital  Assessment & Plan:  I discussed her care with the health department-her highest titer-was    Rpr- 11/- 1:16-   3 weeks of bicillin  10/28/2010- 1:4  11/3/2011- 1:8  04/- 1:8  02/05/2018-1.8  07/09/2019- 1;2  11/19/2019- 1;8-NO TREATMENT NOTED   03/- 1;4-   Treated in 2020 -3 doses     2/7/2023 12/13/2024   RPR Quantitative 1:8 !  1:4 !       At this time- she is in a serofast state.  She denies ocular symptoms , auditory issues .   She will need LP to rule out CNS infection if she develops any CNS symptoms   Will repeat RPR in 6 months- no need to treat at this time  We had an extensive discussion about the natural history of syphilis     She denies any prior history of hallucination or suicidal attempts contrary to previous records       Orders:  -     Ambulatory referral/consult to Infectious Disease  -     RPR (for monitoring); Future; Expected date: 08/18/2025                 [1]   Social History  Socioeconomic History    Marital status: Single   Tobacco Use    Smoking status: Never    Smokeless tobacco: Never   Substance and Sexual Activity    Alcohol use: No    Drug use: No    Sexual activity: Yes     Partners: Male     Birth control/protection: Surgical     Comment: [facility]     Social Drivers of Health     Financial Resource Strain: Low Risk  (1/24/2023)    Received from [facility] Subsidiaries and Affiliates    Overall Financial Resource Strain (CARDIA)     Difficulty of Paying Living Expenses: Not hard at all 
  Housing Stability: Unknown (1/24/2023)    Received from [facility] Subsidiaries and Affiliates    Housing Stability Vital Sign     Unable to Pay for Housing in the Last Year: No     
[employer] dose 1 and 2
No

## 2025-03-20 NOTE — OB RN PATIENT PROFILE - NSRUBEOLADATE_OBGYN_ALL_OB
Per chart review, no recent calls made to patient.  Returned patient call and informed her that no calls have been made recently from our office.  Patient verbalized understanding and had no other questions/concerns at this time.      15-Dec-2020

## 2025-03-25 ENCOUNTER — EMERGENCY (EMERGENCY)
Facility: HOSPITAL | Age: 40
LOS: 1 days | Discharge: HOME | End: 2025-03-25
Attending: STUDENT IN AN ORGANIZED HEALTH CARE EDUCATION/TRAINING PROGRAM
Payer: COMMERCIAL

## 2025-03-25 ENCOUNTER — NON-APPOINTMENT (OUTPATIENT)
Age: 40
End: 2025-03-25

## 2025-03-25 VITALS
RESPIRATION RATE: 19 BRPM | DIASTOLIC BLOOD PRESSURE: 81 MMHG | SYSTOLIC BLOOD PRESSURE: 116 MMHG | HEIGHT: 63 IN | TEMPERATURE: 98 F | WEIGHT: 167.99 LBS | OXYGEN SATURATION: 99 % | HEART RATE: 75 BPM

## 2025-03-25 VITALS
RESPIRATION RATE: 17 BRPM | HEART RATE: 78 BPM | SYSTOLIC BLOOD PRESSURE: 108 MMHG | TEMPERATURE: 99 F | DIASTOLIC BLOOD PRESSURE: 66 MMHG | OXYGEN SATURATION: 99 %

## 2025-03-25 DIAGNOSIS — Z98.891 HISTORY OF UTERINE SCAR FROM PREVIOUS SURGERY: Chronic | ICD-10-CM

## 2025-03-25 DIAGNOSIS — Z98.890 OTHER SPECIFIED POSTPROCEDURAL STATES: Chronic | ICD-10-CM

## 2025-03-25 LAB
ALBUMIN SERPL ELPH-MCNC: 4.7 G/DL — SIGNIFICANT CHANGE UP (ref 3.3–5)
ALP SERPL-CCNC: 57 U/L — SIGNIFICANT CHANGE UP (ref 40–120)
ALT FLD-CCNC: 24 U/L — SIGNIFICANT CHANGE UP (ref 10–45)
ANION GAP SERPL CALC-SCNC: 12 MMOL/L — SIGNIFICANT CHANGE UP (ref 5–17)
APPEARANCE UR: CLEAR — SIGNIFICANT CHANGE UP
AST SERPL-CCNC: 19 U/L — SIGNIFICANT CHANGE UP (ref 10–40)
BASOPHILS # BLD AUTO: 0.03 K/UL — SIGNIFICANT CHANGE UP (ref 0–0.2)
BASOPHILS NFR BLD AUTO: 0.4 % — SIGNIFICANT CHANGE UP (ref 0–2)
BILIRUB SERPL-MCNC: 0.5 MG/DL — SIGNIFICANT CHANGE UP (ref 0.2–1.2)
BILIRUB UR-MCNC: NEGATIVE — SIGNIFICANT CHANGE UP
BUN SERPL-MCNC: 11 MG/DL — SIGNIFICANT CHANGE UP (ref 7–23)
CALCIUM SERPL-MCNC: 9.5 MG/DL — SIGNIFICANT CHANGE UP (ref 8.4–10.5)
CHLORIDE SERPL-SCNC: 106 MMOL/L — SIGNIFICANT CHANGE UP (ref 96–108)
CO2 SERPL-SCNC: 23 MMOL/L — SIGNIFICANT CHANGE UP (ref 22–31)
COLOR SPEC: YELLOW — SIGNIFICANT CHANGE UP
CREAT SERPL-MCNC: 0.69 MG/DL — SIGNIFICANT CHANGE UP (ref 0.5–1.3)
DIFF PNL FLD: NEGATIVE — SIGNIFICANT CHANGE UP
EGFR: 115 ML/MIN/1.73M2 — SIGNIFICANT CHANGE UP
EGFR: 115 ML/MIN/1.73M2 — SIGNIFICANT CHANGE UP
EOSINOPHIL # BLD AUTO: 0.11 K/UL — SIGNIFICANT CHANGE UP (ref 0–0.5)
EOSINOPHIL NFR BLD AUTO: 1.5 % — SIGNIFICANT CHANGE UP (ref 0–6)
GLUCOSE SERPL-MCNC: 100 MG/DL — HIGH (ref 70–99)
GLUCOSE UR QL: NEGATIVE MG/DL — SIGNIFICANT CHANGE UP
HCG SERPL-ACNC: <2 MIU/ML — SIGNIFICANT CHANGE UP
HCT VFR BLD CALC: 38.8 % — SIGNIFICANT CHANGE UP (ref 34.5–45)
HGB BLD-MCNC: 13 G/DL — SIGNIFICANT CHANGE UP (ref 11.5–15.5)
IMM GRANULOCYTES NFR BLD AUTO: 0.3 % — SIGNIFICANT CHANGE UP (ref 0–0.9)
KETONES UR-MCNC: NEGATIVE MG/DL — SIGNIFICANT CHANGE UP
LEUKOCYTE ESTERASE UR-ACNC: NEGATIVE — SIGNIFICANT CHANGE UP
LIDOCAIN IGE QN: 24 U/L — SIGNIFICANT CHANGE UP (ref 7–60)
LYMPHOCYTES # BLD AUTO: 1.96 K/UL — SIGNIFICANT CHANGE UP (ref 1–3.3)
LYMPHOCYTES # BLD AUTO: 27.4 % — SIGNIFICANT CHANGE UP (ref 13–44)
MCHC RBC-ENTMCNC: 30.7 PG — SIGNIFICANT CHANGE UP (ref 27–34)
MCHC RBC-ENTMCNC: 33.5 G/DL — SIGNIFICANT CHANGE UP (ref 32–36)
MCV RBC AUTO: 91.5 FL — SIGNIFICANT CHANGE UP (ref 80–100)
MONOCYTES # BLD AUTO: 0.32 K/UL — SIGNIFICANT CHANGE UP (ref 0–0.9)
MONOCYTES NFR BLD AUTO: 4.5 % — SIGNIFICANT CHANGE UP (ref 2–14)
NEUTROPHILS # BLD AUTO: 4.71 K/UL — SIGNIFICANT CHANGE UP (ref 1.8–7.4)
NEUTROPHILS NFR BLD AUTO: 65.9 % — SIGNIFICANT CHANGE UP (ref 43–77)
NITRITE UR-MCNC: NEGATIVE — SIGNIFICANT CHANGE UP
NRBC BLD AUTO-RTO: 0 /100 WBCS — SIGNIFICANT CHANGE UP (ref 0–0)
PH UR: 6.5 — SIGNIFICANT CHANGE UP (ref 5–8)
PLATELET # BLD AUTO: 261 K/UL — SIGNIFICANT CHANGE UP (ref 150–400)
POTASSIUM SERPL-MCNC: 4.1 MMOL/L — SIGNIFICANT CHANGE UP (ref 3.5–5.3)
POTASSIUM SERPL-SCNC: 4.1 MMOL/L — SIGNIFICANT CHANGE UP (ref 3.5–5.3)
PROT SERPL-MCNC: 7.2 G/DL — SIGNIFICANT CHANGE UP (ref 6–8.3)
PROT UR-MCNC: NEGATIVE MG/DL — SIGNIFICANT CHANGE UP
RBC # BLD: 4.24 M/UL — SIGNIFICANT CHANGE UP (ref 3.8–5.2)
RBC # FLD: 13.2 % — SIGNIFICANT CHANGE UP (ref 10.3–14.5)
SODIUM SERPL-SCNC: 141 MMOL/L — SIGNIFICANT CHANGE UP (ref 135–145)
SP GR SPEC: <1.005 — LOW (ref 1–1.03)
TROPONIN T, HIGH SENSITIVITY RESULT: <6 NG/L — SIGNIFICANT CHANGE UP (ref 0–51)
UROBILINOGEN FLD QL: 0.2 MG/DL — SIGNIFICANT CHANGE UP (ref 0.2–1)
WBC # BLD: 7.15 K/UL — SIGNIFICANT CHANGE UP (ref 3.8–10.5)
WBC # FLD AUTO: 7.15 K/UL — SIGNIFICANT CHANGE UP (ref 3.8–10.5)

## 2025-03-25 PROCEDURE — 84702 CHORIONIC GONADOTROPIN TEST: CPT

## 2025-03-25 PROCEDURE — 93975 VASCULAR STUDY: CPT | Mod: 26

## 2025-03-25 PROCEDURE — 99283 EMERGENCY DEPT VISIT LOW MDM: CPT

## 2025-03-25 PROCEDURE — 84484 ASSAY OF TROPONIN QUANT: CPT

## 2025-03-25 PROCEDURE — 81003 URINALYSIS AUTO W/O SCOPE: CPT

## 2025-03-25 PROCEDURE — 74177 CT ABD & PELVIS W/CONTRAST: CPT | Mod: 26

## 2025-03-25 PROCEDURE — 99285 EMERGENCY DEPT VISIT HI MDM: CPT

## 2025-03-25 PROCEDURE — 93975 VASCULAR STUDY: CPT

## 2025-03-25 PROCEDURE — 85025 COMPLETE CBC W/AUTO DIFF WBC: CPT

## 2025-03-25 PROCEDURE — 99285 EMERGENCY DEPT VISIT HI MDM: CPT | Mod: 25

## 2025-03-25 PROCEDURE — 96375 TX/PRO/DX INJ NEW DRUG ADDON: CPT

## 2025-03-25 PROCEDURE — 93005 ELECTROCARDIOGRAM TRACING: CPT

## 2025-03-25 PROCEDURE — 96374 THER/PROPH/DIAG INJ IV PUSH: CPT | Mod: XU

## 2025-03-25 PROCEDURE — 76830 TRANSVAGINAL US NON-OB: CPT | Mod: 26

## 2025-03-25 PROCEDURE — 76830 TRANSVAGINAL US NON-OB: CPT

## 2025-03-25 PROCEDURE — 83690 ASSAY OF LIPASE: CPT

## 2025-03-25 PROCEDURE — 93010 ELECTROCARDIOGRAM REPORT: CPT

## 2025-03-25 PROCEDURE — 80053 COMPREHEN METABOLIC PANEL: CPT

## 2025-03-25 PROCEDURE — 74177 CT ABD & PELVIS W/CONTRAST: CPT | Mod: MC

## 2025-03-25 RX ORDER — ACETAMINOPHEN 500 MG/5ML
1000 LIQUID (ML) ORAL ONCE
Refills: 0 | Status: COMPLETED | OUTPATIENT
Start: 2025-03-25 | End: 2025-03-25

## 2025-03-25 RX ORDER — KETOROLAC TROMETHAMINE 30 MG/ML
15 INJECTION, SOLUTION INTRAMUSCULAR; INTRAVENOUS ONCE
Refills: 0 | Status: DISCONTINUED | OUTPATIENT
Start: 2025-03-25 | End: 2025-03-25

## 2025-03-25 RX ADMIN — Medication 400 MILLIGRAM(S): at 12:26

## 2025-03-25 RX ADMIN — Medication 1000 MILLILITER(S): at 12:26

## 2025-03-25 RX ADMIN — KETOROLAC TROMETHAMINE 15 MILLIGRAM(S): 30 INJECTION, SOLUTION INTRAMUSCULAR; INTRAVENOUS at 16:04

## 2025-03-25 RX ADMIN — Medication 20 MILLIGRAM(S): at 17:54

## 2025-03-25 RX ADMIN — Medication 1000 MILLILITER(S): at 16:04

## 2025-03-25 RX ADMIN — Medication 10 MILLIGRAM(S): at 17:54

## 2025-03-25 NOTE — ED PROVIDER NOTE - ATTENDING CONTRIBUTION TO CARE
Attending Cholo: I performed a history and physical exam of the patient and discussed their management with the resident/fellow/student. I have reviewed the resident/fellow/student note and agree with the documented findings and plan of care, except as noted. I have personally performed a substantive portion of the visit including all aspects of the medical decision making. My medical decision making and observations are found below. Please refer to any progress notes for updates on clinical course. My notes supersedes the above resident/fellow/student note in case of discrepancy    MDM:  38 y/o F w/ PMH of IBS, anxiety, kidney stones, ovarian cyst (s/p cystectomy) presenting w/ abdominal pain. Seen w/ . Reports has been having 2 days of L sided abdominal pain. Often gets abdominal pain around the time of her menses so was not initially concerned. However pain persistent and had large amount of non bloody diarrhea. Pain is sharp and radiates from left lower abdomen to left flank. Tried taking tylenol and  motrin yesterday which did not help much. Spoke w/ her OB/gyn after being seen by urgent care who recommended to come to ED due to her hx of ruptured cyst. Denies fevers, chills, headache, dizziness, blurred vision, chest pain, cough, shortness of breath, nausea, vomiting, urinary symptoms, MSK pain, rash.     Gen: NAD, AOx3, able to make needs known, non-toxic  Head: NCAT  HEENT: EOMI, oral mucosa moist, normal conjunctiva  Lung: no respiratory distress, speaking in full sentences, CTAB, no wheezes/rhonchi/rales B/L  CV: RRR, no murmurs  Abd: non distended, soft, LLQ and LUQ tenderness, no guarding, no CVA tenderness  MSK: no visible deformities  Neuro: Appears non focal  Skin: Warm, well perfused  Psych: normal affect    Pt overall no acute distress. Will evaluate for ovarian pathology, but less likely torsion given clinical presentation. Will eval for cyst rupture. Eval for abdominal pathology such as colitis or diverticulitis. Less likely renal colic. Doubtful pyelo. Plan for labs, imaging, meds, EKG. Will reassess the need for additional interventions as clinically warranted. Refer to any progress notes for updates on clinical course and as a continuation of this MDM.     I, Dr. Fernando Emmanuel, independently interpreted the EKG which showed NSR at a rate of 60, QTc 428.

## 2025-03-25 NOTE — ED PROVIDER NOTE - NSICDXPASTSURGICALHX_GEN_ALL_CORE_FT
PAST SURGICAL HISTORY:  H/O ovarian cystectomy 10yrs ago    H/O:  2017 GDM failwed induction    History of orthopedic surgery Knee and foot surgery    
MILD

## 2025-03-25 NOTE — CONSULT NOTE ADULT - SUBJECTIVE AND OBJECTIVE BOX
*INCOMPLETE NOTE*    SHELBIE PYLE  39y  Female [id number]    HPI:        Name of GYN Physician: Dr. Rishi San  OBHx: C/S x2 (2017, 2021)  GynHx: +h/o abnormal pap smears s/p LEEP x2, +h/o ovarian cyst s/p cystectomy Denies fibroids, endometriosis, STI's  PMH: Vertigo, Migraines  PSH: C/S x2, LEEP x2, LSC L ovarian cystectomy (many years ago), [place] teeth removal, L foot and L knee surgery  Meds: Topimax, Wellbutrin  Allx: Soy, Lidocaine, Opiates,   Social History:  Denies smoking use, drug use, alcohol use.      Vital Signs Last 24 Hrs  T(C): 36.7 (25 Mar 2025 11:18), Max: 36.7 (25 Mar 2025 11:18)  T(F): 98 (25 Mar 2025 11:18), Max: 98 (25 Mar 2025 11:18)  HR: 75 (25 Mar 2025 11:18) (75 - 75)  BP: 116/81 (25 Mar 2025 11:18) (116/81 - 116/81)  BP(mean): --  RR: 19 (25 Mar 2025 11:18) (19 - 19)  SpO2: 99% (25 Mar 2025 11:18) (99% - 99%)        Physical Exam:   General: sitting comfortably in bed, NAD   HEENT: neck supple, full ROM  CV: RRR  Lungs: nonlabored respirations  Back: No CVA tenderness  Abd: Soft, nondistended, +tenderness to palpation in LUQ and LLQ, no rebound, no guarding  :  No bleeding on pad.   Ext: non-tender b/l, no edema     LABS:                I&O's Detail          RADIOLOGY & ADDITIONAL STUDIES:     SHELBIE PYLE  39y  Female [id number]    HPI:  38 y/o  with Mirena IUD in place presenting to ED with LLQ pain that started yesterday. Patient states she noticed sharp pain that radiates to her L lower back and LUQ pain. She states the pain was abrupt. She has taken Tylenol and Motrin at home with some improvement in pain, last taken at 2 am overnight. She went to urgent care this morning where she was told to come to ED for ruptured ovarian cyst. Patient states she did not receive any imaging at urgent care. She is also endorsing loose bowel movements since yesterday. She is endorsing some fatigue but denies fevers/chills. She denies nausea/vomiting. She denies sick contacts. She denies any vaginal bleeding, discharge. She denies any dysuria or urinary frequency.     Name of GYN Physician: Dr. Rishi San  OBHx: C/S x2 (, )  GynHx: +h/o abnormal pap smears s/p LEEP x2, +h/o ovarian cyst s/p cystectomy Denies fibroids, endometriosis, STI's  PMH: Vertigo, Migraines  PSH: C/S x2, LEEP x2, LSC L ovarian cystectomy (many years ago), [place] teeth removal, L foot and L knee surgery  Meds: Topimax, Wellbutrin  Allx: Soy, Lidocaine, Opiates,   Social History:  Denies smoking use, drug use, alcohol use.      Vital Signs Last 24 Hrs  T(C): 36.7 (25 Mar 2025 11:18), Max: 36.7 (25 Mar 2025 11:18)  T(F): 98 (25 Mar 2025 11:18), Max: 98 (25 Mar 2025 11:18)  HR: 75 (25 Mar 2025 11:18) (75 - 75)  BP: 116/81 (25 Mar 2025 11:18) (116/81 - 116/81)  BP(mean): --  RR: 19 (25 Mar 2025 11:18) (19 - 19)  SpO2: 99% (25 Mar 2025 11:18) (99% - 99%)        Physical Exam:   General: sitting comfortably in bed, NAD   HEENT: neck supple, full ROM  CV: RRR  Lungs: nonlabored respirations  Back: No CVA tenderness  Abd: Soft, nondistended, +tenderness to palpation in LUQ and LLQ, no rebound, no guarding  :  No bleeding on pad.   Ext: non-tender b/l, no edema     LABS:                I&O's Detail          RADIOLOGY & ADDITIONAL STUDIES:

## 2025-03-25 NOTE — ED PROVIDER NOTE - NSFOLLOWUPCLINICS_GEN_ALL_ED_FT
[facility]  Gastroenterology  [address] [place] [id number]  Phone: [phone]  Fax:   Follow Up Time: 1-3 Days

## 2025-03-25 NOTE — ED PROVIDER NOTE - PATIENT PORTAL LINK FT
You can access the FollowMyHealth Patient Portal offered by [facility] by registering at the following website: [email or web address]. By joining [employer]’s FollowMyHealth portal, you will also be able to view your health information using other applications (apps) compatible with our system.

## 2025-03-25 NOTE — ED ADULT NURSE REASSESSMENT NOTE - NS ED NURSE REASSESS COMMENT FT1
Assumed care of pt from ABDIAZIZ Fulton. Pt awake and alert, still endorsing abd pain at this time. Vital signs stable at this time. OB at bedside at this time. Safety and comfort measures maintained.

## 2025-03-25 NOTE — CONSULT NOTE ADULT - ASSESSMENT
A/P: 38 y/o  with Mirena IUD in place sent in from urgent care for LLQ pain since yesterday with associated diarrhea. VSS, physical exam with tenderness in LUQ and LLQ. Differential at this time remains broad, although ruptured ovarian cyst and ovarian torsion cannot be ruled out    - Recommend TVUS and CBC/CMP  - GYN to follow    D/w Dr. San,   Harriet Godinez PGY2  A/P: 38 y/o  with Mirena IUD in place sent in from urgent care for LLQ pain since yesterday with associated diarrhea. VSS, physical exam with tenderness in LUQ and LLQ. Differential at this time remains broad, although ruptured ovarian cyst and ovarian torsion cannot be ruled out    - Recommend TVUS and CBC/CMP  - GYN to follow    D/w Dr. San,   Harriet Godinez PGY2       R4 430p re-evaluation  -Patient seen and evaluated still complaining of LLQ   -Bimanual performed with no CMT, +bladder tenderness appreciated  -Speculum exam: No discharge appreciated from cervical os  -TVUS reviewed with patient: No acute GYN pathology on imaging (follicles on US)  -Given benign pelvic exam and lack of GYN pathology of imaging, low concern that pelvic pain is GYN in origin  -Pain management per ED  -No acute GYN intervention, please reconsult with any further questions    discussed w Dr Mena Nunez PGY4

## 2025-03-25 NOTE — ED PROVIDER NOTE - CLINICAL SUMMARY MEDICAL DECISION MAKING FREE TEXT BOX
EM PGY-2/Kvng DO: 39-year-old female PMHx IBS (waxing and waning constipation and diarrhea), history of ruptured ovarian cyst s/p cyst removal, anxiety, kidney stones presents to ED sent by urgent care for evaluation of 2-day onset constant sharp LLQ pain that she noticed after having BM.  Patient states that around once a month before she gets her menstrual period she will have a large bout of diarrhea, she states she had that expected BM yesterday and began experiencing pain subsequently.  Also had large bout of diarrhea this a.m.  Denies any blood or mucus present.  Endorses that pain radiates to her left flank.  Last taken Tylenol/Motrin yesterday.  Called OB/GYN and route here given history of ruptured cyst requiring surgery, OB at bedside during initial encounter requesting TVUS.  Patient otherwise denies any fevers, chills, N/V/B, chest pain, SOB, leg swelling, rashes, urinary symptoms, vaginal symptoms.  Patient otherwise asymptomatic at this time.    Patient adamantly states she does not want any medication other than over-the-counter analgesia.    MDM: 39-year-old female with above-mentioned history presenting with 2-day onset LLQ pain.  Abdomen is soft, diffusely tender to the left upper and lower side with rebound and guarding, uncomfortable but nontoxic-appearing, exam otherwise nonfocal.  High suspicion for diverticulitis, ovarian etiology, possibly kidney stone.  Will evaluate for infectious, metabolic etiology.  Reeval to dispo. EM PGY-2/Kvng DO: 39-year-old female PMHx IBS (waxing and waning constipation and diarrhea), history of ruptured ovarian cyst s/p cyst removal, anxiety, kidney stones presents to ED sent by urgent care for evaluation of 2-day onset constant sharp LLQ pain that she noticed after having BM.  Patient states that around once a month before she gets her menstrual period she will have a large bout of diarrhea, she states she had that expected BM yesterday and began experiencing pain subsequently.  Also had large bout of diarrhea this a.m.  Denies any blood or mucus present.  Endorses that pain radiates to her left flank.  Last taken Tylenol/Motrin yesterday.  Called OB/GYN and route here given history of ruptured cyst requiring surgery, OB at bedside during initial encounter requesting TVUS.  Patient otherwise denies any fevers, chills, N/V/B, chest pain, SOB, leg swelling, rashes, urinary symptoms, vaginal symptoms.  Patient otherwise asymptomatic at this time.    Patient adamantly states she does not want any medication other than over-the-counter analgesia.    MDM: 39-year-old female with above-mentioned history presenting with 2-day onset LLQ pain.  Abdomen is soft, diffusely tender to the left upper and lower side with rebound and guarding, uncomfortable but nontoxic-appearing, exam otherwise nonfocal.  High suspicion for diverticulitis, ovarian etiology, possibly kidney stone.  Will evaluate for infectious, metabolic etiology.  Reeval to dispo.    Attending Cholo: See attending attestation.

## 2025-03-25 NOTE — ED PROVIDER NOTE - PROGRESS NOTE DETAILS
Attending Cholo: signed out to Dr. Irving pending US/CT reads and Gyn final recs. pt reassessed informed of possible colitis, neg wbc, no fever, no chills, pt nontoxic appearing, seen by gyn ok to go home, pt reports she has a gi doctor, pt abd reassessed is soft w/ minimal tenderness, pt w/ monthly hx of recurrent diarrhea, she denies any blood, no po inatake today will have PO challenge Abimael Echevarria PA-C: pt signed out to me by previous team. all results reviewed and discussed with patient. tolerating PO. advised on importance of following up with her pcp as well as GI doctor outpatient. strict return precautions discussed. patient well appearing. stable for discharge. discussed with ED Attending

## 2025-03-25 NOTE — ED CLERICAL - NS ED CLERK NOTE PRE-ARRIVAL INFORMATION; ADDITIONAL PRE-ARRIVAL INFORMATION
CC/Reason For referral: Low quad pain; loose stool   Preferred Consultant(if applicable):  Who admits for you (if needed):  Do you have documents you would like to fax over?  Would you still like to speak to an ED attending? y

## 2025-03-25 NOTE — ED ADULT NURSE NOTE - NSICDXPASTSURGICALHX_GEN_ALL_CORE_FT
PAST SURGICAL HISTORY:  H/O ovarian cystectomy 10yrs ago    H/O:  2017 GDM failwed induction    History of orthopedic surgery Knee and foot surgery

## 2025-03-25 NOTE — ED PROVIDER NOTE - NSFOLLOWUPINSTRUCTIONS_ED_ALL_ED_FT
You have been evaluated in the Emergency Department today for abdominal pain. Your evaluation did not show evidence of medical conditions requiring emergent intervention at this time. The results of your labs and imaging are included in your discharge instructions -- bring with you to any follow up appointments you have to show your doctors.    Follow up with your primary doctor in the next week.    I have notified someone in our system to contact you within the next 2-3 business days to help set up an appointment with an GASTROENTEROLOGIST (GI or STOMACH DOCTOR). Please schedule that appointment as soon as you are able to. If you do not hear from them in the next few business days, please call the number listed on the first page of your discharge paperwork.\    UNLESS OTHERWISE DIRECTED BY YOUR PRIMARY DOCTOR, for discomfort at home, you can alternate between 600mg ibuprofen (Motrin, Alleve, Advil, etc.) and 650-975mg acetaminophen (Tylenol) every 6-8 hours. If your pain is severe, you can take them both together at the same time. Please do not exceed 3200mg ibuprofen or 4000mg Tylenol in one day. Please consult with your primary doctor before taking any medications on a regular basis.  - If you have a history of KIDNEY DISEASE, BLEEDING PROBLEMS, ACID REFLUX or STOMACH ULCERS, medications such as ibuprofen, Alleve, Motrin, etc. may not be a good choice for you.  - If you have a history of LIVER DISEASE or REGULAR ALCOHOL USE, acetaminophen (Tylenol) may not be a good choice for you.    Return to the Emergency Department if you experience worsening or uncontrolled pain, fevers 100.4°F or greater, recurrent vomiting, inability to tolerate food or fluids by mouth, bloody stools or vomit, black or tarry stools, or any other concerning symptoms.

## 2025-03-25 NOTE — ED ADULT NURSE NOTE - OBJECTIVE STATEMENT
39 y.o F A&OX4 w. PMH of ovarian cyst removal, kidney stones, IBS presents to ED complaining of LLQ pain. Pt states that sharp intermittent LLQ pain started yesterday. Pt went to UC earlier today and was advised to come to the ED for rule out ovarian cyst rupture or diverticulitis. Pt endorses diarrhea today.

## 2025-03-26 ENCOUNTER — APPOINTMENT (OUTPATIENT)
Dept: CT IMAGING | Facility: IMAGING CENTER | Age: 40
End: 2025-03-26
Payer: COMMERCIAL

## 2025-03-26 ENCOUNTER — OUTPATIENT (OUTPATIENT)
Dept: OUTPATIENT SERVICES | Facility: HOSPITAL | Age: 40
LOS: 1 days | End: 2025-03-26
Payer: COMMERCIAL

## 2025-03-26 DIAGNOSIS — Z98.890 OTHER SPECIFIED POSTPROCEDURAL STATES: Chronic | ICD-10-CM

## 2025-03-26 DIAGNOSIS — K52.9 NONINFECTIVE GASTROENTERITIS AND COLITIS, UNSPECIFIED: ICD-10-CM

## 2025-03-26 PROCEDURE — 74177 CT ABD & PELVIS W/CONTRAST: CPT

## 2025-03-26 PROCEDURE — 74177 CT ABD & PELVIS W/CONTRAST: CPT | Mod: 26

## 2025-03-27 ENCOUNTER — OUTPATIENT (OUTPATIENT)
Dept: OUTPATIENT SERVICES | Facility: HOSPITAL | Age: 40
LOS: 1 days | End: 2025-03-27
Payer: COMMERCIAL

## 2025-03-27 ENCOUNTER — APPOINTMENT (OUTPATIENT)
Dept: ULTRASOUND IMAGING | Facility: CLINIC | Age: 40
End: 2025-03-27
Payer: COMMERCIAL

## 2025-03-27 DIAGNOSIS — Z98.890 OTHER SPECIFIED POSTPROCEDURAL STATES: Chronic | ICD-10-CM

## 2025-03-27 DIAGNOSIS — Z98.891 HISTORY OF UTERINE SCAR FROM PREVIOUS SURGERY: Chronic | ICD-10-CM

## 2025-03-27 PROCEDURE — 93975 VASCULAR STUDY: CPT

## 2025-03-27 PROCEDURE — 93975 VASCULAR STUDY: CPT | Mod: 26

## 2025-04-01 ENCOUNTER — APPOINTMENT (OUTPATIENT)
Dept: MRI IMAGING | Facility: CLINIC | Age: 40
End: 2025-04-01
Payer: COMMERCIAL

## 2025-04-01 PROCEDURE — 72148 MRI LUMBAR SPINE W/O DYE: CPT

## 2025-04-14 ENCOUNTER — APPOINTMENT (OUTPATIENT)
Dept: OBGYN | Facility: CLINIC | Age: 40
End: 2025-04-14
Payer: COMMERCIAL

## 2025-04-14 VITALS
WEIGHT: 176 LBS | DIASTOLIC BLOOD PRESSURE: 66 MMHG | SYSTOLIC BLOOD PRESSURE: 102 MMHG | HEIGHT: 63 IN | BODY MASS INDEX: 31.18 KG/M2

## 2025-04-14 DIAGNOSIS — G89.29 PELVIC AND PERINEAL PAIN: ICD-10-CM

## 2025-04-14 DIAGNOSIS — R10.2 PELVIC AND PERINEAL PAIN: ICD-10-CM

## 2025-04-14 DIAGNOSIS — Z00.00 ENCOUNTER FOR GENERAL ADULT MEDICAL EXAMINATION W/OUT ABNORMAL FINDINGS: ICD-10-CM

## 2025-04-14 PROCEDURE — 99213 OFFICE O/P EST LOW 20 MIN: CPT

## 2025-04-14 NOTE — PHYSICAL EXAM
[Appropriately responsive] : appropriately responsive [Alert] : alert [No Acute Distress] : no acute distress [Oriented x3] : oriented x3 [Labia Majora] : normal [Labia Minora] : normal [Normal] : normal [Tenderness] : tender [Uterine Adnexae] : normal

## 2025-04-15 NOTE — PLAN
[FreeTextEntry1] : 39 year old for chronic pelvic pain consistent with likely endometriosis  -Continue IUD for the moment -pelvic MRI -Not a candidate for estrogen -Discussed other medical/surgical options, pt declines -Proceed with hysterectomy -mammo/sono

## 2025-04-15 NOTE — END OF VISIT
[FreeTextEntry3] :  I, Vanessa Cramer, acted as a scribe on behalf of Dr. Audie Champion M.D. on 04/14/2025.   All medical entries made by the scribe were at my Dr. Audie Champion M.D direction and personally dictated by me on 04/14/2025. I have reviewed the chart and agree that the record accurately reflects my personal performance of the history, physical exam, assessment and plan. I have also personally directed, reviewed, and agreed with the chart.

## 2025-04-15 NOTE — HISTORY OF PRESENT ILLNESS
[FreeTextEntry1] : 39 year old presents with gradually increasing and worsening pelvic pain. This is similar to the pain she had prior to the insertion of her Mirena IUD. She has hx chronic pelvic pain.  Her IUD is in good position now. Pt has been followed by GI with no etiology of pain except possible gyn/endometriosis in origin.   Pt was seen in [facility] ED for a ruptured ovarian cyst 3/25/25.  Subsequent pelvic sono performed on 03/25/25 was wnl.  ACC: [id number] EXAM: US DPLX PELVIC ORDERED BY: SAKSHI TREJO  ACC: [id number] EXAM: US TRANSVAGINAL ORDERED BY: SAKSHI TREJO  PROCEDURE DATE: 03/25/2025  INTERPRETATION: CLINICAL INFORMATION: 39-year-old with history of IUD and left pelvic pain since yesterday  LMP: 02/13/2025  COMPARISON: 7/30/2024  TECHNIQUE: Transabdominal and transvaginal ultrasound examination pelvis was performed.   FINDINGS: Uterus: 9.4 cm x 5.1 cm x 6.1 cm. Within normal limits. Endometrium: 7 mm. An IUD is seen within the endometrial canal.  Right ovary: 2.8 cm x 2.0 cm x 1.9 cm. Small follicles are seen. Arterial and venous flow is demonstrated. Left ovary: 2.7 cm x 1.8 cm x 1.8 cm. Small follicles are seen. Arterial and venous flow is demonstrated.  Fluid: None.  IMPRESSION: Both ovaries are normal in appearance. There is no evidence of ovarian torsion.  --- End of Report ---      JOVON WAN MD; Attending Radiologist This document has been electronically signed. Mar 25 2025 4:27PM  ACC: [id number] EXAM: CT ABDOMEN AND PELVIS IC ORDERED BY: SAKSHI TREJO  PROCEDURE DATE: 03/25/2025    INTERPRETATION: CLINICAL INFORMATION: Left lower quadrant pain. Evaluate for stone, diverticulitis, or ovarian etiology.  COMPARISON: Pelvic ultrasound 3/25/2025. Outside institution CT abdomen and pelvis 1/26/2018.  CONTRAST/COMPLICATIONS: IV Contrast: Omnipaque 350 90 cc administered 10 cc discarded Oral Contrast: NONE  PROCEDURE: CT of the Abdomen and Pelvis was performed. Sagittal and coronal reformats were performed.  FINDINGS: LOWER CHEST: Within normal limits.  LIVER: Within normal limits. BILE DUCTS: Normal caliber. GALLBLADDER: Within normal limits. SPLEEN: Within normal limits. PANCREAS: Within normal limits. ADRENALS: Within normal limits. KIDNEYS/URETERS: No hydronephrosis. No evidence of obstructive nephrolithiasis. Normal and symmetric renal enhancement.  BLADDER: Within normal limits. REPRODUCTIVE ORGANS: Intrauterine device. Unremarkable CT appearance of the adnexa. Please refer to the same day pelvic ultrasound.  BOWEL: No bowel obstruction. Appendix is normal. No colonic diverticula are identified. The colon is diffusely underdistended with questioned wall thickening in the transverse colon (for example series 602 image 24). PERITONEUM/RETROPERITONEUM: Within normal limits. VESSELS: Within normal limits. LYMPH NODES: No lymphadenopathy. ABDOMINAL WALL: Rectus diastasis. BONES: Within normal limits.  IMPRESSION: * Question wall thickening in the transverse colon, which may be secondary to underdistention, with colitis also considered. * Otherwise, no acute abdominopelvic findings.     --- End of Report ---     PETERSON FRAGA MD; Resident Radiologist This document has been electronically signed. JOSÉ MIGUEL BRISENO MD; Attending Radiologist This document has been electronically signed. Mar 25 2025 4:47PM

## 2025-04-15 NOTE — HISTORY OF PRESENT ILLNESS
[FreeTextEntry1] : 39 year old presents with gradually increasing and worsening pelvic pain. This is similar to the pain she had prior to the insertion of her Mirena IUD. She has hx chronic pelvic pain.  Her IUD is in good position now. Pt has been followed by GI with no etiology of pain except possible gyn/endometriosis in origin.   Pt was seen in [facility] ED for a ruptured ovarian cyst 3/25/25.  Subsequent pelvic sono performed on 03/25/25 was wnl.  ACC: [id number] EXAM: US DPLX PELVIC ORDERED BY: SAKSHI TREJO  ACC: [id number] EXAM: US TRANSVAGINAL ORDERED BY: ASKSHI TREJO  PROCEDURE DATE: 03/25/2025  INTERPRETATION: CLINICAL INFORMATION: 39-year-old with history of IUD and left pelvic pain since yesterday  LMP: 02/13/2025  COMPARISON: 7/30/2024  TECHNIQUE: Transabdominal and transvaginal ultrasound examination pelvis was performed.   FINDINGS: Uterus: 9.4 cm x 5.1 cm x 6.1 cm. Within normal limits. Endometrium: 7 mm. An IUD is seen within the endometrial canal.  Right ovary: 2.8 cm x 2.0 cm x 1.9 cm. Small follicles are seen. Arterial and venous flow is demonstrated. Left ovary: 2.7 cm x 1.8 cm x 1.8 cm. Small follicles are seen. Arterial and venous flow is demonstrated.  Fluid: None.  IMPRESSION: Both ovaries are normal in appearance. There is no evidence of ovarian torsion.  --- End of Report ---      JOVON WAN MD; Attending Radiologist This document has been electronically signed. Mar 25 2025 4:27PM  ACC: [id number] EXAM: CT ABDOMEN AND PELVIS IC ORDERED BY: SAKSHI TREJO  PROCEDURE DATE: 03/25/2025    INTERPRETATION: CLINICAL INFORMATION: Left lower quadrant pain. Evaluate for stone, diverticulitis, or ovarian etiology.  COMPARISON: Pelvic ultrasound 3/25/2025. Outside institution CT abdomen and pelvis 1/26/2018.  CONTRAST/COMPLICATIONS: IV Contrast: Omnipaque 350 90 cc administered 10 cc discarded Oral Contrast: NONE  PROCEDURE: CT of the Abdomen and Pelvis was performed. Sagittal and coronal reformats were performed.  FINDINGS: LOWER CHEST: Within normal limits.  LIVER: Within normal limits. BILE DUCTS: Normal caliber. GALLBLADDER: Within normal limits. SPLEEN: Within normal limits. PANCREAS: Within normal limits. ADRENALS: Within normal limits. KIDNEYS/URETERS: No hydronephrosis. No evidence of obstructive nephrolithiasis. Normal and symmetric renal enhancement.  BLADDER: Within normal limits. REPRODUCTIVE ORGANS: Intrauterine device. Unremarkable CT appearance of the adnexa. Please refer to the same day pelvic ultrasound.  BOWEL: No bowel obstruction. Appendix is normal. No colonic diverticula are identified. The colon is diffusely underdistended with questioned wall thickening in the transverse colon (for example series 602 image 24). PERITONEUM/RETROPERITONEUM: Within normal limits. VESSELS: Within normal limits. LYMPH NODES: No lymphadenopathy. ABDOMINAL WALL: Rectus diastasis. BONES: Within normal limits.  IMPRESSION: * Question wall thickening in the transverse colon, which may be secondary to underdistention, with colitis also considered. * Otherwise, no acute abdominopelvic findings.     --- End of Report ---     PETERSON FRAGA MD; Resident Radiologist This document has been electronically signed. JOSÉ MIGUEL BRISENO MD; Attending Radiologist This document has been electronically signed. Mar 25 2025 4:47PM

## 2025-04-16 LAB — HPV HIGH+LOW RISK DNA PNL CVX: NOT DETECTED

## 2025-04-20 LAB — CYTOLOGY CVX/VAG DOC THIN PREP: NORMAL

## 2025-04-29 ENCOUNTER — OUTPATIENT (OUTPATIENT)
Dept: OUTPATIENT SERVICES | Facility: HOSPITAL | Age: 40
LOS: 1 days | End: 2025-04-29
Payer: COMMERCIAL

## 2025-04-29 ENCOUNTER — RESULT REVIEW (OUTPATIENT)
Age: 40
End: 2025-04-29

## 2025-04-29 ENCOUNTER — APPOINTMENT (OUTPATIENT)
Dept: MRI IMAGING | Facility: CLINIC | Age: 40
End: 2025-04-29

## 2025-04-29 DIAGNOSIS — Z98.891 HISTORY OF UTERINE SCAR FROM PREVIOUS SURGERY: Chronic | ICD-10-CM

## 2025-04-29 DIAGNOSIS — Z98.890 OTHER SPECIFIED POSTPROCEDURAL STATES: Chronic | ICD-10-CM

## 2025-04-29 PROCEDURE — 72197 MRI PELVIS W/O & W/DYE: CPT | Mod: 26

## 2025-05-21 ENCOUNTER — APPOINTMENT (OUTPATIENT)
Dept: ULTRASOUND IMAGING | Facility: CLINIC | Age: 40
End: 2025-05-21
Payer: COMMERCIAL

## 2025-05-21 ENCOUNTER — OUTPATIENT (OUTPATIENT)
Dept: OUTPATIENT SERVICES | Facility: HOSPITAL | Age: 40
LOS: 1 days | End: 2025-05-21
Payer: COMMERCIAL

## 2025-05-21 ENCOUNTER — RESULT REVIEW (OUTPATIENT)
Age: 40
End: 2025-05-21

## 2025-05-21 ENCOUNTER — APPOINTMENT (OUTPATIENT)
Dept: MAMMOGRAPHY | Facility: CLINIC | Age: 40
End: 2025-05-21
Payer: COMMERCIAL

## 2025-05-21 DIAGNOSIS — Z00.8 ENCOUNTER FOR OTHER GENERAL EXAMINATION: ICD-10-CM

## 2025-05-21 DIAGNOSIS — Z98.891 HISTORY OF UTERINE SCAR FROM PREVIOUS SURGERY: Chronic | ICD-10-CM

## 2025-05-21 DIAGNOSIS — Z98.890 OTHER SPECIFIED POSTPROCEDURAL STATES: Chronic | ICD-10-CM

## 2025-05-21 PROCEDURE — 77066 DX MAMMO INCL CAD BI: CPT | Mod: 26

## 2025-05-21 PROCEDURE — 76641 ULTRASOUND BREAST COMPLETE: CPT

## 2025-05-21 PROCEDURE — G0279: CPT

## 2025-05-21 PROCEDURE — 76641 ULTRASOUND BREAST COMPLETE: CPT | Mod: 26,50

## 2025-05-21 PROCEDURE — G0279: CPT | Mod: 26

## 2025-05-21 PROCEDURE — 77066 DX MAMMO INCL CAD BI: CPT

## 2025-08-05 ENCOUNTER — TRANSCRIPTION ENCOUNTER (OUTPATIENT)
Age: 40
End: 2025-08-05